# Patient Record
Sex: FEMALE | Race: OTHER | NOT HISPANIC OR LATINO
[De-identification: names, ages, dates, MRNs, and addresses within clinical notes are randomized per-mention and may not be internally consistent; named-entity substitution may affect disease eponyms.]

---

## 2017-01-26 ENCOUNTER — APPOINTMENT (OUTPATIENT)
Dept: INFUSION THERAPY | Facility: HOSPITAL | Age: 82
End: 2017-01-26

## 2017-01-26 ENCOUNTER — APPOINTMENT (OUTPATIENT)
Dept: HEMATOLOGY ONCOLOGY | Facility: CLINIC | Age: 82
End: 2017-01-26

## 2017-02-20 ENCOUNTER — LABORATORY RESULT (OUTPATIENT)
Age: 82
End: 2017-02-20

## 2017-02-22 ENCOUNTER — OUTPATIENT (OUTPATIENT)
Dept: OUTPATIENT SERVICES | Facility: HOSPITAL | Age: 82
LOS: 1 days | Discharge: HOME | End: 2017-02-22

## 2017-02-22 DIAGNOSIS — C50.911 MALIGNANT NEOPLASM OF UNSPECIFIED SITE OF RIGHT FEMALE BREAST: ICD-10-CM

## 2017-02-22 DIAGNOSIS — Z98.89 OTHER SPECIFIED POSTPROCEDURAL STATES: Chronic | ICD-10-CM

## 2017-02-22 DIAGNOSIS — C79.51 SECONDARY MALIGNANT NEOPLASM OF BONE: ICD-10-CM

## 2017-02-23 ENCOUNTER — APPOINTMENT (OUTPATIENT)
Dept: HEMATOLOGY ONCOLOGY | Facility: CLINIC | Age: 82
End: 2017-02-23

## 2017-02-23 ENCOUNTER — APPOINTMENT (OUTPATIENT)
Dept: INFUSION THERAPY | Facility: HOSPITAL | Age: 82
End: 2017-02-23

## 2017-02-23 VITALS
BODY MASS INDEX: 22.86 KG/M2 | RESPIRATION RATE: 100 BRPM | SYSTOLIC BLOOD PRESSURE: 120 MMHG | HEART RATE: 70 BPM | WEIGHT: 108 LBS | DIASTOLIC BLOOD PRESSURE: 80 MMHG | TEMPERATURE: 97.8 F

## 2017-03-23 ENCOUNTER — APPOINTMENT (OUTPATIENT)
Dept: INFUSION THERAPY | Facility: HOSPITAL | Age: 82
End: 2017-03-23

## 2017-03-23 ENCOUNTER — APPOINTMENT (OUTPATIENT)
Dept: HEMATOLOGY ONCOLOGY | Facility: CLINIC | Age: 82
End: 2017-03-23

## 2017-04-14 ENCOUNTER — LABORATORY RESULT (OUTPATIENT)
Age: 82
End: 2017-04-14

## 2017-04-18 ENCOUNTER — OUTPATIENT (OUTPATIENT)
Dept: OUTPATIENT SERVICES | Facility: HOSPITAL | Age: 82
LOS: 1 days | Discharge: HOME | End: 2017-04-18

## 2017-04-18 DIAGNOSIS — C50.911 MALIGNANT NEOPLASM OF UNSPECIFIED SITE OF RIGHT FEMALE BREAST: ICD-10-CM

## 2017-04-18 DIAGNOSIS — Z98.89 OTHER SPECIFIED POSTPROCEDURAL STATES: Chronic | ICD-10-CM

## 2017-04-20 ENCOUNTER — APPOINTMENT (OUTPATIENT)
Dept: INFUSION THERAPY | Facility: HOSPITAL | Age: 82
End: 2017-04-20

## 2017-04-20 ENCOUNTER — APPOINTMENT (OUTPATIENT)
Dept: HEMATOLOGY ONCOLOGY | Facility: CLINIC | Age: 82
End: 2017-04-20

## 2017-04-20 VITALS
RESPIRATION RATE: 16 BRPM | DIASTOLIC BLOOD PRESSURE: 64 MMHG | TEMPERATURE: 98.4 F | HEART RATE: 72 BPM | BODY MASS INDEX: 23.05 KG/M2 | WEIGHT: 108.91 LBS | SYSTOLIC BLOOD PRESSURE: 110 MMHG

## 2017-04-20 DIAGNOSIS — L57.0 ACTINIC KERATOSIS: ICD-10-CM

## 2017-04-21 DIAGNOSIS — C79.51 SECONDARY MALIGNANT NEOPLASM OF BONE: ICD-10-CM

## 2017-04-28 ENCOUNTER — CLINICAL ADVICE (OUTPATIENT)
Age: 82
End: 2017-04-28

## 2017-05-04 ENCOUNTER — OTHER (OUTPATIENT)
Age: 82
End: 2017-05-04

## 2017-05-05 ENCOUNTER — OTHER (OUTPATIENT)
Age: 82
End: 2017-05-05

## 2017-05-13 ENCOUNTER — LABORATORY RESULT (OUTPATIENT)
Age: 82
End: 2017-05-13

## 2017-05-15 ENCOUNTER — RESULT REVIEW (OUTPATIENT)
Age: 82
End: 2017-05-15

## 2017-05-16 ENCOUNTER — OUTPATIENT (OUTPATIENT)
Dept: OUTPATIENT SERVICES | Facility: HOSPITAL | Age: 82
LOS: 1 days | Discharge: HOME | End: 2017-05-16

## 2017-05-16 DIAGNOSIS — C50.911 MALIGNANT NEOPLASM OF UNSPECIFIED SITE OF RIGHT FEMALE BREAST: ICD-10-CM

## 2017-05-16 DIAGNOSIS — C79.51 SECONDARY MALIGNANT NEOPLASM OF BONE: ICD-10-CM

## 2017-05-16 DIAGNOSIS — Z98.89 OTHER SPECIFIED POSTPROCEDURAL STATES: Chronic | ICD-10-CM

## 2017-05-17 ENCOUNTER — APPOINTMENT (OUTPATIENT)
Dept: HEMATOLOGY ONCOLOGY | Facility: CLINIC | Age: 82
End: 2017-05-17
Payer: MEDICARE

## 2017-05-17 ENCOUNTER — APPOINTMENT (OUTPATIENT)
Dept: INFUSION THERAPY | Facility: HOSPITAL | Age: 82
End: 2017-05-17

## 2017-05-17 VITALS
HEART RATE: 64 BPM | SYSTOLIC BLOOD PRESSURE: 120 MMHG | DIASTOLIC BLOOD PRESSURE: 70 MMHG | OXYGEN SATURATION: 100 % | WEIGHT: 106.92 LBS | RESPIRATION RATE: 16 BRPM | BODY MASS INDEX: 22.63 KG/M2 | TEMPERATURE: 97.8 F

## 2017-05-17 DIAGNOSIS — R20.0 ANESTHESIA OF SKIN: ICD-10-CM

## 2017-05-17 PROCEDURE — 99215 OFFICE O/P EST HI 40 MIN: CPT

## 2017-05-31 ENCOUNTER — APPOINTMENT (OUTPATIENT)
Dept: INFUSION THERAPY | Facility: HOSPITAL | Age: 82
End: 2017-05-31

## 2017-06-10 ENCOUNTER — LABORATORY RESULT (OUTPATIENT)
Age: 82
End: 2017-06-10

## 2017-06-14 ENCOUNTER — APPOINTMENT (OUTPATIENT)
Dept: HEMATOLOGY ONCOLOGY | Facility: CLINIC | Age: 82
End: 2017-06-14
Payer: MEDICARE

## 2017-06-14 ENCOUNTER — APPOINTMENT (OUTPATIENT)
Dept: INFUSION THERAPY | Facility: HOSPITAL | Age: 82
End: 2017-06-14

## 2017-06-14 VITALS
TEMPERATURE: 97.7 F | WEIGHT: 105.82 LBS | RESPIRATION RATE: 16 BRPM | HEART RATE: 64 BPM | DIASTOLIC BLOOD PRESSURE: 70 MMHG | SYSTOLIC BLOOD PRESSURE: 120 MMHG | BODY MASS INDEX: 22.4 KG/M2 | OXYGEN SATURATION: 100 %

## 2017-06-14 PROCEDURE — 99215 OFFICE O/P EST HI 40 MIN: CPT

## 2017-06-14 RX ORDER — TRAMADOL HYDROCHLORIDE 50 MG/1
50 TABLET, COATED ORAL 4 TIMES DAILY
Qty: 120 | Refills: 0 | Status: DISCONTINUED | COMMUNITY
Start: 2017-05-17 | End: 2017-06-14

## 2017-07-15 ENCOUNTER — LABORATORY RESULT (OUTPATIENT)
Age: 82
End: 2017-07-15

## 2017-07-17 ENCOUNTER — OUTPATIENT (OUTPATIENT)
Dept: OUTPATIENT SERVICES | Facility: HOSPITAL | Age: 82
LOS: 1 days | Discharge: HOME | End: 2017-07-17

## 2017-07-17 DIAGNOSIS — Z98.89 OTHER SPECIFIED POSTPROCEDURAL STATES: Chronic | ICD-10-CM

## 2017-07-17 DIAGNOSIS — C50.911 MALIGNANT NEOPLASM OF UNSPECIFIED SITE OF RIGHT FEMALE BREAST: ICD-10-CM

## 2017-07-19 ENCOUNTER — APPOINTMENT (OUTPATIENT)
Dept: HEMATOLOGY ONCOLOGY | Facility: CLINIC | Age: 82
End: 2017-07-19

## 2017-07-19 ENCOUNTER — APPOINTMENT (OUTPATIENT)
Dept: INFUSION THERAPY | Facility: HOSPITAL | Age: 82
End: 2017-07-19

## 2017-07-19 VITALS
BODY MASS INDEX: 48.06 KG/M2 | OXYGEN SATURATION: 99 % | WEIGHT: 105.82 LBS | DIASTOLIC BLOOD PRESSURE: 70 MMHG | RESPIRATION RATE: 16 BRPM | TEMPERATURE: 97.4 F | SYSTOLIC BLOOD PRESSURE: 130 MMHG | HEART RATE: 66 BPM

## 2017-07-19 RX ORDER — ACETAMINOPHEN 500 MG/1
500 TABLET, COATED ORAL
Refills: 0 | Status: DISCONTINUED | COMMUNITY
Start: 2017-06-14 | End: 2017-07-19

## 2017-07-20 DIAGNOSIS — C79.51 SECONDARY MALIGNANT NEOPLASM OF BONE: ICD-10-CM

## 2017-08-10 ENCOUNTER — LABORATORY RESULT (OUTPATIENT)
Age: 82
End: 2017-08-10

## 2017-08-16 ENCOUNTER — APPOINTMENT (OUTPATIENT)
Dept: HEMATOLOGY ONCOLOGY | Facility: CLINIC | Age: 82
End: 2017-08-16
Payer: MEDICARE

## 2017-08-16 ENCOUNTER — APPOINTMENT (OUTPATIENT)
Dept: INFUSION THERAPY | Facility: HOSPITAL | Age: 82
End: 2017-08-16

## 2017-08-16 VITALS
SYSTOLIC BLOOD PRESSURE: 154 MMHG | OXYGEN SATURATION: 100 % | DIASTOLIC BLOOD PRESSURE: 77 MMHG | TEMPERATURE: 97.6 F | HEART RATE: 66 BPM | RESPIRATION RATE: 16 BRPM | BODY MASS INDEX: 22.35 KG/M2 | WEIGHT: 105.6 LBS

## 2017-08-16 DIAGNOSIS — E03.9 HYPOTHYROIDISM, UNSPECIFIED: ICD-10-CM

## 2017-08-16 PROCEDURE — 99214 OFFICE O/P EST MOD 30 MIN: CPT

## 2017-09-11 ENCOUNTER — LABORATORY RESULT (OUTPATIENT)
Age: 82
End: 2017-09-11

## 2017-09-11 ENCOUNTER — OUTPATIENT (OUTPATIENT)
Dept: OUTPATIENT SERVICES | Facility: HOSPITAL | Age: 82
LOS: 1 days | Discharge: HOME | End: 2017-09-11

## 2017-09-11 DIAGNOSIS — C50.911 MALIGNANT NEOPLASM OF UNSPECIFIED SITE OF RIGHT FEMALE BREAST: ICD-10-CM

## 2017-09-11 DIAGNOSIS — Z98.89 OTHER SPECIFIED POSTPROCEDURAL STATES: Chronic | ICD-10-CM

## 2017-09-12 ENCOUNTER — MEDICATION RENEWAL (OUTPATIENT)
Age: 82
End: 2017-09-12

## 2017-09-12 ENCOUNTER — RX RENEWAL (OUTPATIENT)
Age: 82
End: 2017-09-12

## 2017-09-13 ENCOUNTER — APPOINTMENT (OUTPATIENT)
Dept: HEMATOLOGY ONCOLOGY | Facility: CLINIC | Age: 82
End: 2017-09-13
Payer: MEDICARE

## 2017-09-13 ENCOUNTER — APPOINTMENT (OUTPATIENT)
Dept: INFUSION THERAPY | Facility: HOSPITAL | Age: 82
End: 2017-09-13

## 2017-09-13 VITALS
DIASTOLIC BLOOD PRESSURE: 73 MMHG | WEIGHT: 102.74 LBS | SYSTOLIC BLOOD PRESSURE: 160 MMHG | OXYGEN SATURATION: 99 % | RESPIRATION RATE: 16 BRPM | BODY MASS INDEX: 21.74 KG/M2 | TEMPERATURE: 97.7 F | HEART RATE: 69 BPM

## 2017-09-13 VITALS
SYSTOLIC BLOOD PRESSURE: 160 MMHG | WEIGHT: 102.74 LBS | DIASTOLIC BLOOD PRESSURE: 73 MMHG | TEMPERATURE: 97.7 F | OXYGEN SATURATION: 100 % | HEART RATE: 69 BPM | BODY MASS INDEX: 21.74 KG/M2 | RESPIRATION RATE: 16 BRPM

## 2017-09-13 DIAGNOSIS — R63.4 ABNORMAL WEIGHT LOSS: ICD-10-CM

## 2017-09-13 DIAGNOSIS — F32.9 MAJOR DEPRESSIVE DISORDER, SINGLE EPISODE, UNSPECIFIED: ICD-10-CM

## 2017-09-13 PROCEDURE — 99215 OFFICE O/P EST HI 40 MIN: CPT

## 2017-09-14 DIAGNOSIS — C79.51 SECONDARY MALIGNANT NEOPLASM OF BONE: ICD-10-CM

## 2017-10-13 ENCOUNTER — OUTPATIENT (OUTPATIENT)
Dept: OUTPATIENT SERVICES | Facility: HOSPITAL | Age: 82
LOS: 1 days | Discharge: HOME | End: 2017-10-13

## 2017-10-13 ENCOUNTER — LABORATORY RESULT (OUTPATIENT)
Age: 82
End: 2017-10-13

## 2017-10-13 DIAGNOSIS — Z98.89 OTHER SPECIFIED POSTPROCEDURAL STATES: Chronic | ICD-10-CM

## 2017-10-13 DIAGNOSIS — C50.911 MALIGNANT NEOPLASM OF UNSPECIFIED SITE OF RIGHT FEMALE BREAST: ICD-10-CM

## 2017-10-19 ENCOUNTER — APPOINTMENT (OUTPATIENT)
Dept: INFUSION THERAPY | Facility: HOSPITAL | Age: 82
End: 2017-10-19

## 2017-10-19 ENCOUNTER — APPOINTMENT (OUTPATIENT)
Dept: HEMATOLOGY ONCOLOGY | Facility: CLINIC | Age: 82
End: 2017-10-19
Payer: MEDICARE

## 2017-10-19 VITALS
TEMPERATURE: 97.8 F | RESPIRATION RATE: 16 BRPM | WEIGHT: 103.62 LBS | SYSTOLIC BLOOD PRESSURE: 120 MMHG | OXYGEN SATURATION: 97 % | DIASTOLIC BLOOD PRESSURE: 80 MMHG | HEART RATE: 62 BPM | BODY MASS INDEX: 21.93 KG/M2

## 2017-10-19 PROCEDURE — 99214 OFFICE O/P EST MOD 30 MIN: CPT

## 2017-10-20 DIAGNOSIS — C79.51 SECONDARY MALIGNANT NEOPLASM OF BONE: ICD-10-CM

## 2017-11-02 ENCOUNTER — OTHER (OUTPATIENT)
Age: 82
End: 2017-11-02

## 2017-11-02 ENCOUNTER — MEDICATION RENEWAL (OUTPATIENT)
Age: 82
End: 2017-11-02

## 2017-11-06 ENCOUNTER — LABORATORY RESULT (OUTPATIENT)
Age: 82
End: 2017-11-06

## 2017-11-07 ENCOUNTER — OTHER (OUTPATIENT)
Age: 82
End: 2017-11-07

## 2017-11-09 ENCOUNTER — OUTPATIENT (OUTPATIENT)
Dept: OUTPATIENT SERVICES | Facility: HOSPITAL | Age: 82
LOS: 1 days | Discharge: HOME | End: 2017-11-09

## 2017-11-09 DIAGNOSIS — C50.911 MALIGNANT NEOPLASM OF UNSPECIFIED SITE OF RIGHT FEMALE BREAST: ICD-10-CM

## 2017-11-09 DIAGNOSIS — Z98.89 OTHER SPECIFIED POSTPROCEDURAL STATES: Chronic | ICD-10-CM

## 2017-11-14 ENCOUNTER — APPOINTMENT (OUTPATIENT)
Dept: HEMATOLOGY ONCOLOGY | Facility: CLINIC | Age: 82
End: 2017-11-14
Payer: MEDICARE

## 2017-11-14 ENCOUNTER — APPOINTMENT (OUTPATIENT)
Dept: INFUSION THERAPY | Facility: HOSPITAL | Age: 82
End: 2017-11-14

## 2017-11-14 VITALS
BODY MASS INDEX: 21.14 KG/M2 | RESPIRATION RATE: 16 BRPM | WEIGHT: 102.07 LBS | SYSTOLIC BLOOD PRESSURE: 169 MMHG | HEIGHT: 58.31 IN | OXYGEN SATURATION: 98 % | DIASTOLIC BLOOD PRESSURE: 83 MMHG | HEART RATE: 71 BPM | TEMPERATURE: 98.3 F

## 2017-11-14 DIAGNOSIS — R97.8 OTHER ABNORMAL TUMOR MARKERS: ICD-10-CM

## 2017-11-14 PROCEDURE — 99215 OFFICE O/P EST HI 40 MIN: CPT

## 2017-11-14 PROCEDURE — 99213 OFFICE O/P EST LOW 20 MIN: CPT

## 2017-11-14 RX ORDER — GABAPENTIN 100 MG/1
100 CAPSULE ORAL
Qty: 60 | Refills: 6 | Status: DISCONTINUED | COMMUNITY
Start: 2017-05-17 | End: 2017-11-14

## 2017-11-14 RX ORDER — TRAMADOL HYDROCHLORIDE 50 MG/1
50 TABLET, COATED ORAL
Qty: 60 | Refills: 0 | Status: DISCONTINUED | COMMUNITY
Start: 2017-09-12 | End: 2017-11-14

## 2017-11-14 RX ORDER — MELOXICAM 7.5 MG/1
7.5 TABLET ORAL
Qty: 5 | Refills: 0 | Status: DISCONTINUED | COMMUNITY
Start: 2017-11-02 | End: 2017-11-14

## 2017-11-15 DIAGNOSIS — C79.51 SECONDARY MALIGNANT NEOPLASM OF BONE: ICD-10-CM

## 2017-11-17 ENCOUNTER — MESSAGE (OUTPATIENT)
Age: 82
End: 2017-11-17

## 2017-12-06 ENCOUNTER — LABORATORY RESULT (OUTPATIENT)
Age: 82
End: 2017-12-06

## 2017-12-07 ENCOUNTER — OUTPATIENT (OUTPATIENT)
Dept: OUTPATIENT SERVICES | Facility: HOSPITAL | Age: 82
LOS: 1 days | Discharge: HOME | End: 2017-12-07

## 2017-12-07 DIAGNOSIS — C79.51 SECONDARY MALIGNANT NEOPLASM OF BONE: ICD-10-CM

## 2017-12-07 DIAGNOSIS — C50.911 MALIGNANT NEOPLASM OF UNSPECIFIED SITE OF RIGHT FEMALE BREAST: ICD-10-CM

## 2017-12-07 DIAGNOSIS — Z98.89 OTHER SPECIFIED POSTPROCEDURAL STATES: Chronic | ICD-10-CM

## 2017-12-07 RX ORDER — HYDROMORPHONE HYDROCHLORIDE 2 MG/1
2 TABLET ORAL
Qty: 15 | Refills: 0 | Status: DISCONTINUED | COMMUNITY
Start: 2017-11-14 | End: 2017-11-17

## 2017-12-08 ENCOUNTER — RESULT REVIEW (OUTPATIENT)
Age: 82
End: 2017-12-08

## 2017-12-14 ENCOUNTER — APPOINTMENT (OUTPATIENT)
Dept: HEMATOLOGY ONCOLOGY | Facility: CLINIC | Age: 82
End: 2017-12-14
Payer: MEDICARE

## 2017-12-14 ENCOUNTER — APPOINTMENT (OUTPATIENT)
Dept: INFUSION THERAPY | Facility: HOSPITAL | Age: 82
End: 2017-12-14

## 2017-12-14 VITALS
DIASTOLIC BLOOD PRESSURE: 68 MMHG | BODY MASS INDEX: 21.65 KG/M2 | RESPIRATION RATE: 16 BRPM | SYSTOLIC BLOOD PRESSURE: 112 MMHG | WEIGHT: 104.72 LBS | TEMPERATURE: 98 F | HEART RATE: 63 BPM | OXYGEN SATURATION: 100 %

## 2017-12-14 DIAGNOSIS — M25.552 PAIN IN LEFT HIP: ICD-10-CM

## 2017-12-14 DIAGNOSIS — K22.70 BARRETT'S ESOPHAGUS W/OUT DYSPLASIA: ICD-10-CM

## 2017-12-14 DIAGNOSIS — R13.10 DYSPHAGIA, UNSPECIFIED: ICD-10-CM

## 2017-12-14 PROCEDURE — 99215 OFFICE O/P EST HI 40 MIN: CPT

## 2017-12-14 PROCEDURE — 99214 OFFICE O/P EST MOD 30 MIN: CPT

## 2017-12-14 RX ORDER — TRAMADOL HYDROCHLORIDE 50 MG/1
50 TABLET, COATED ORAL
Refills: 0 | Status: DISCONTINUED | COMMUNITY
Start: 2017-05-17 | End: 2017-12-14

## 2017-12-29 ENCOUNTER — OUTPATIENT (OUTPATIENT)
Dept: OUTPATIENT SERVICES | Facility: HOSPITAL | Age: 82
LOS: 1 days | Discharge: HOME | End: 2017-12-29

## 2017-12-29 DIAGNOSIS — C50.911 MALIGNANT NEOPLASM OF UNSPECIFIED SITE OF RIGHT FEMALE BREAST: ICD-10-CM

## 2017-12-29 DIAGNOSIS — Z98.89 OTHER SPECIFIED POSTPROCEDURAL STATES: Chronic | ICD-10-CM

## 2017-12-29 DIAGNOSIS — C79.51 SECONDARY MALIGNANT NEOPLASM OF BONE: ICD-10-CM

## 2018-01-02 ENCOUNTER — LABORATORY RESULT (OUTPATIENT)
Age: 83
End: 2018-01-02

## 2018-01-03 PROBLEM — M25.552 LEFT HIP PAIN: Status: RESOLVED | Noted: 2017-11-14 | Resolved: 2018-01-03

## 2018-01-03 PROBLEM — R13.10 DYSPHAGIA: Status: ACTIVE | Noted: 2017-11-14

## 2018-01-09 ENCOUNTER — APPOINTMENT (OUTPATIENT)
Dept: HEMATOLOGY ONCOLOGY | Facility: CLINIC | Age: 83
End: 2018-01-09
Payer: MEDICARE

## 2018-01-09 ENCOUNTER — APPOINTMENT (OUTPATIENT)
Dept: INFUSION THERAPY | Facility: HOSPITAL | Age: 83
End: 2018-01-09

## 2018-01-09 VITALS
HEART RATE: 59 BPM | DIASTOLIC BLOOD PRESSURE: 74 MMHG | WEIGHT: 105.82 LBS | RESPIRATION RATE: 16 BRPM | TEMPERATURE: 98.6 F | BODY MASS INDEX: 21.88 KG/M2 | SYSTOLIC BLOOD PRESSURE: 151 MMHG | OXYGEN SATURATION: 100 %

## 2018-01-09 DIAGNOSIS — L98.9 DISORDER OF THE SKIN AND SUBCUTANEOUS TISSUE, UNSPECIFIED: ICD-10-CM

## 2018-01-09 DIAGNOSIS — M19.90 UNSPECIFIED OSTEOARTHRITIS, UNSPECIFIED SITE: ICD-10-CM

## 2018-01-09 PROCEDURE — 99215 OFFICE O/P EST HI 40 MIN: CPT

## 2018-01-09 PROCEDURE — 99214 OFFICE O/P EST MOD 30 MIN: CPT

## 2018-01-09 RX ORDER — GINSENG 500 MG
TABLET ORAL
Refills: 0 | Status: DISCONTINUED | COMMUNITY
End: 2018-01-09

## 2018-01-09 RX ORDER — PNV NO.95/FERROUS FUM/FOLIC AC 28MG-0.8MG
TABLET ORAL
Refills: 0 | Status: DISCONTINUED | COMMUNITY
End: 2018-01-09

## 2018-01-09 RX ORDER — GABAPENTIN 250 MG/5ML
250 SOLUTION ORAL
Qty: 100 | Refills: 0 | Status: DISCONTINUED | COMMUNITY
Start: 2017-11-14 | End: 2018-01-09

## 2018-01-09 RX ORDER — ASCORBIC ACID 500 MG
500 TABLET ORAL
Refills: 0 | Status: ACTIVE | COMMUNITY
Start: 2018-01-09

## 2018-01-09 RX ORDER — ACETAMINOPHEN AND CODEINE PHOSPHATE 120; 12 MG/5ML; MG/5ML
120-12 SOLUTION ORAL
Qty: 150 | Refills: 0 | Status: DISCONTINUED | COMMUNITY
Start: 2017-11-14 | End: 2018-01-09

## 2018-01-09 RX ORDER — B-COMPLEX WITH VITAMIN C
TABLET ORAL
Refills: 0 | Status: DISCONTINUED | COMMUNITY
End: 2018-01-09

## 2018-01-31 ENCOUNTER — LABORATORY RESULT (OUTPATIENT)
Age: 83
End: 2018-01-31

## 2018-02-01 ENCOUNTER — OUTPATIENT (OUTPATIENT)
Dept: OUTPATIENT SERVICES | Facility: HOSPITAL | Age: 83
LOS: 1 days | Discharge: HOME | End: 2018-02-01

## 2018-02-01 DIAGNOSIS — Z98.89 OTHER SPECIFIED POSTPROCEDURAL STATES: Chronic | ICD-10-CM

## 2018-02-01 DIAGNOSIS — C50.911 MALIGNANT NEOPLASM OF UNSPECIFIED SITE OF RIGHT FEMALE BREAST: ICD-10-CM

## 2018-02-01 DIAGNOSIS — C79.51 SECONDARY MALIGNANT NEOPLASM OF BONE: ICD-10-CM

## 2018-02-06 ENCOUNTER — APPOINTMENT (OUTPATIENT)
Dept: HEMATOLOGY ONCOLOGY | Facility: CLINIC | Age: 83
End: 2018-02-06
Payer: MEDICARE

## 2018-02-06 ENCOUNTER — APPOINTMENT (OUTPATIENT)
Dept: INFUSION THERAPY | Facility: HOSPITAL | Age: 83
End: 2018-02-06

## 2018-02-06 VITALS
BODY MASS INDEX: 22.11 KG/M2 | RESPIRATION RATE: 16 BRPM | HEART RATE: 66 BPM | WEIGHT: 106.9 LBS | DIASTOLIC BLOOD PRESSURE: 68 MMHG | SYSTOLIC BLOOD PRESSURE: 158 MMHG | TEMPERATURE: 97.9 F | OXYGEN SATURATION: 100 %

## 2018-02-06 PROCEDURE — 99214 OFFICE O/P EST MOD 30 MIN: CPT

## 2018-03-01 ENCOUNTER — OUTPATIENT (OUTPATIENT)
Dept: OUTPATIENT SERVICES | Facility: HOSPITAL | Age: 83
LOS: 1 days | Discharge: HOME | End: 2018-03-01

## 2018-03-01 ENCOUNTER — LABORATORY RESULT (OUTPATIENT)
Age: 83
End: 2018-03-01

## 2018-03-01 DIAGNOSIS — C79.51 SECONDARY MALIGNANT NEOPLASM OF BONE: ICD-10-CM

## 2018-03-01 DIAGNOSIS — Z98.89 OTHER SPECIFIED POSTPROCEDURAL STATES: Chronic | ICD-10-CM

## 2018-03-01 DIAGNOSIS — C50.911 MALIGNANT NEOPLASM OF UNSPECIFIED SITE OF RIGHT FEMALE BREAST: ICD-10-CM

## 2018-03-06 ENCOUNTER — APPOINTMENT (OUTPATIENT)
Dept: INFUSION THERAPY | Facility: HOSPITAL | Age: 83
End: 2018-03-06

## 2018-03-06 ENCOUNTER — APPOINTMENT (OUTPATIENT)
Dept: HEMATOLOGY ONCOLOGY | Facility: CLINIC | Age: 83
End: 2018-03-06
Payer: MEDICARE

## 2018-03-06 VITALS
BODY MASS INDEX: 21.88 KG/M2 | SYSTOLIC BLOOD PRESSURE: 173 MMHG | WEIGHT: 105.82 LBS | HEART RATE: 73 BPM | TEMPERATURE: 98 F | OXYGEN SATURATION: 100 % | DIASTOLIC BLOOD PRESSURE: 80 MMHG | RESPIRATION RATE: 16 BRPM

## 2018-03-06 VITALS — SYSTOLIC BLOOD PRESSURE: 157 MMHG | DIASTOLIC BLOOD PRESSURE: 71 MMHG

## 2018-03-06 PROCEDURE — 99214 OFFICE O/P EST MOD 30 MIN: CPT

## 2018-03-29 ENCOUNTER — LABORATORY RESULT (OUTPATIENT)
Age: 83
End: 2018-03-29

## 2018-04-03 ENCOUNTER — OUTPATIENT (OUTPATIENT)
Dept: OUTPATIENT SERVICES | Facility: HOSPITAL | Age: 83
LOS: 1 days | Discharge: HOME | End: 2018-04-03

## 2018-04-03 ENCOUNTER — RESULT REVIEW (OUTPATIENT)
Age: 83
End: 2018-04-03

## 2018-04-03 DIAGNOSIS — Z98.89 OTHER SPECIFIED POSTPROCEDURAL STATES: Chronic | ICD-10-CM

## 2018-04-03 DIAGNOSIS — C50.911 MALIGNANT NEOPLASM OF UNSPECIFIED SITE OF RIGHT FEMALE BREAST: ICD-10-CM

## 2018-04-03 DIAGNOSIS — C79.51 SECONDARY MALIGNANT NEOPLASM OF BONE: ICD-10-CM

## 2018-04-06 ENCOUNTER — APPOINTMENT (OUTPATIENT)
Dept: HEMATOLOGY ONCOLOGY | Facility: CLINIC | Age: 83
End: 2018-04-06
Payer: MEDICARE

## 2018-04-06 ENCOUNTER — APPOINTMENT (OUTPATIENT)
Dept: INFUSION THERAPY | Facility: HOSPITAL | Age: 83
End: 2018-04-06

## 2018-04-06 VITALS
BODY MASS INDEX: 22.2 KG/M2 | RESPIRATION RATE: 16 BRPM | TEMPERATURE: 97.6 F | OXYGEN SATURATION: 100 % | DIASTOLIC BLOOD PRESSURE: 76 MMHG | WEIGHT: 107.36 LBS | SYSTOLIC BLOOD PRESSURE: 168 MMHG | HEART RATE: 64 BPM

## 2018-04-06 VITALS
BODY MASS INDEX: 22.13 KG/M2 | HEART RATE: 64 BPM | DIASTOLIC BLOOD PRESSURE: 76 MMHG | SYSTOLIC BLOOD PRESSURE: 168 MMHG | TEMPERATURE: 97.6 F | RESPIRATION RATE: 16 BRPM | OXYGEN SATURATION: 100 % | WEIGHT: 106.99 LBS

## 2018-04-06 PROCEDURE — 99215 OFFICE O/P EST HI 40 MIN: CPT

## 2018-04-06 RX ORDER — CLONAZEPAM 1 MG/1
1 TABLET ORAL
Qty: 15 | Refills: 0 | Status: DISCONTINUED | COMMUNITY
Start: 2017-11-30 | End: 2018-04-06

## 2018-04-06 RX ORDER — HYDROCODONE BITARTRATE AND ACETAMINOPHEN 5; 325 MG/1; MG/1
5-325 TABLET ORAL
Qty: 30 | Refills: 0 | Status: DISCONTINUED | COMMUNITY
Start: 2018-01-09 | End: 2018-04-06

## 2018-04-10 ENCOUNTER — RX RENEWAL (OUTPATIENT)
Age: 83
End: 2018-04-10

## 2018-05-02 ENCOUNTER — LABORATORY RESULT (OUTPATIENT)
Age: 83
End: 2018-05-02

## 2018-05-03 ENCOUNTER — APPOINTMENT (OUTPATIENT)
Dept: INFUSION THERAPY | Facility: HOSPITAL | Age: 83
End: 2018-05-03

## 2018-05-03 ENCOUNTER — APPOINTMENT (OUTPATIENT)
Dept: HEMATOLOGY ONCOLOGY | Facility: CLINIC | Age: 83
End: 2018-05-03
Payer: MEDICARE

## 2018-05-03 VITALS
DIASTOLIC BLOOD PRESSURE: 66 MMHG | WEIGHT: 105.82 LBS | TEMPERATURE: 98.2 F | HEART RATE: 67 BPM | SYSTOLIC BLOOD PRESSURE: 159 MMHG | OXYGEN SATURATION: 100 % | BODY MASS INDEX: 21.88 KG/M2 | RESPIRATION RATE: 16 BRPM

## 2018-05-03 PROCEDURE — 99214 OFFICE O/P EST MOD 30 MIN: CPT

## 2018-05-14 ENCOUNTER — LABORATORY RESULT (OUTPATIENT)
Age: 83
End: 2018-05-14

## 2018-05-22 ENCOUNTER — LABORATORY RESULT (OUTPATIENT)
Age: 83
End: 2018-05-22

## 2018-05-22 ENCOUNTER — RX RENEWAL (OUTPATIENT)
Age: 83
End: 2018-05-22

## 2018-05-23 ENCOUNTER — RESULT REVIEW (OUTPATIENT)
Age: 83
End: 2018-05-23

## 2018-05-25 ENCOUNTER — OUTPATIENT (OUTPATIENT)
Dept: OUTPATIENT SERVICES | Facility: HOSPITAL | Age: 83
LOS: 1 days | Discharge: HOME | End: 2018-05-25

## 2018-05-25 DIAGNOSIS — Z98.89 OTHER SPECIFIED POSTPROCEDURAL STATES: Chronic | ICD-10-CM

## 2018-05-25 DIAGNOSIS — C50.911 MALIGNANT NEOPLASM OF UNSPECIFIED SITE OF RIGHT FEMALE BREAST: ICD-10-CM

## 2018-05-25 DIAGNOSIS — C79.51 SECONDARY MALIGNANT NEOPLASM OF BONE: ICD-10-CM

## 2018-05-31 ENCOUNTER — RESULT REVIEW (OUTPATIENT)
Age: 83
End: 2018-05-31

## 2018-05-31 ENCOUNTER — APPOINTMENT (OUTPATIENT)
Dept: INFUSION THERAPY | Facility: HOSPITAL | Age: 83
End: 2018-05-31

## 2018-05-31 ENCOUNTER — APPOINTMENT (OUTPATIENT)
Dept: HEMATOLOGY ONCOLOGY | Facility: CLINIC | Age: 83
End: 2018-05-31
Payer: MEDICARE

## 2018-05-31 VITALS
HEART RATE: 70 BPM | WEIGHT: 105.82 LBS | SYSTOLIC BLOOD PRESSURE: 120 MMHG | OXYGEN SATURATION: 98 % | DIASTOLIC BLOOD PRESSURE: 70 MMHG | BODY MASS INDEX: 21.88 KG/M2 | TEMPERATURE: 97.8 F | RESPIRATION RATE: 16 BRPM

## 2018-05-31 LAB
HCT VFR BLD CALC: 29.1 % — LOW (ref 34.5–45)
HGB BLD-MCNC: 9.8 G/DL — LOW (ref 11.5–15.5)
MCHC RBC-ENTMCNC: 30.4 PG — SIGNIFICANT CHANGE UP (ref 27–34)
MCHC RBC-ENTMCNC: 33.8 G/DL — SIGNIFICANT CHANGE UP (ref 32–36)
MCV RBC AUTO: 89.9 FL — SIGNIFICANT CHANGE UP (ref 80–100)
PLATELET # BLD AUTO: 270 K/UL — SIGNIFICANT CHANGE UP (ref 150–400)
RBC # BLD: 3.23 M/UL — LOW (ref 3.8–5.2)
RBC # FLD: 15.9 % — HIGH (ref 10.3–14.5)
WBC # BLD: 4.4 K/UL — SIGNIFICANT CHANGE UP (ref 3.8–10.5)
WBC # FLD AUTO: 4.4 K/UL — SIGNIFICANT CHANGE UP (ref 3.8–10.5)

## 2018-05-31 PROCEDURE — 99214 OFFICE O/P EST MOD 30 MIN: CPT

## 2018-06-04 ENCOUNTER — RX RENEWAL (OUTPATIENT)
Age: 83
End: 2018-06-04

## 2018-06-05 ENCOUNTER — MEDICATION RENEWAL (OUTPATIENT)
Age: 83
End: 2018-06-05

## 2018-06-19 ENCOUNTER — LABORATORY RESULT (OUTPATIENT)
Age: 83
End: 2018-06-19

## 2018-06-20 ENCOUNTER — MESSAGE (OUTPATIENT)
Age: 83
End: 2018-06-20

## 2018-06-25 ENCOUNTER — LABORATORY RESULT (OUTPATIENT)
Age: 83
End: 2018-06-25

## 2018-06-25 ENCOUNTER — OTHER (OUTPATIENT)
Age: 83
End: 2018-06-25

## 2018-06-26 ENCOUNTER — OUTPATIENT (OUTPATIENT)
Dept: OUTPATIENT SERVICES | Facility: HOSPITAL | Age: 83
LOS: 1 days | Discharge: HOME | End: 2018-06-26

## 2018-06-26 DIAGNOSIS — C79.51 SECONDARY MALIGNANT NEOPLASM OF BONE: ICD-10-CM

## 2018-06-26 DIAGNOSIS — C50.911 MALIGNANT NEOPLASM OF UNSPECIFIED SITE OF RIGHT FEMALE BREAST: ICD-10-CM

## 2018-06-26 DIAGNOSIS — Z98.89 OTHER SPECIFIED POSTPROCEDURAL STATES: Chronic | ICD-10-CM

## 2018-06-28 ENCOUNTER — RX RENEWAL (OUTPATIENT)
Age: 83
End: 2018-06-28

## 2018-06-29 ENCOUNTER — APPOINTMENT (OUTPATIENT)
Dept: HEMATOLOGY ONCOLOGY | Facility: CLINIC | Age: 83
End: 2018-06-29
Payer: MEDICARE

## 2018-06-29 ENCOUNTER — APPOINTMENT (OUTPATIENT)
Dept: INFUSION THERAPY | Facility: HOSPITAL | Age: 83
End: 2018-06-29

## 2018-06-29 VITALS
HEART RATE: 72 BPM | DIASTOLIC BLOOD PRESSURE: 65 MMHG | BODY MASS INDEX: 21.43 KG/M2 | OXYGEN SATURATION: 100 % | RESPIRATION RATE: 16 BRPM | SYSTOLIC BLOOD PRESSURE: 143 MMHG | WEIGHT: 103.62 LBS | TEMPERATURE: 98 F

## 2018-06-29 DIAGNOSIS — M89.9 DISORDER OF BONE, UNSPECIFIED: ICD-10-CM

## 2018-06-29 DIAGNOSIS — M25.511 PAIN IN RIGHT SHOULDER: ICD-10-CM

## 2018-06-29 DIAGNOSIS — R49.0 DYSPHONIA: ICD-10-CM

## 2018-06-29 DIAGNOSIS — L65.9 NONSCARRING HAIR LOSS, UNSPECIFIED: ICD-10-CM

## 2018-06-29 PROCEDURE — 99214 OFFICE O/P EST MOD 30 MIN: CPT

## 2018-06-29 RX ORDER — LEVOTHYROXINE SODIUM 0.07 MG/1
75 TABLET ORAL
Qty: 90 | Refills: 0 | Status: DISCONTINUED | COMMUNITY
Start: 2018-06-26

## 2018-07-16 ENCOUNTER — LABORATORY RESULT (OUTPATIENT)
Age: 83
End: 2018-07-16

## 2018-07-25 ENCOUNTER — RESULT REVIEW (OUTPATIENT)
Age: 83
End: 2018-07-25

## 2018-07-25 ENCOUNTER — APPOINTMENT (OUTPATIENT)
Dept: INFUSION THERAPY | Facility: HOSPITAL | Age: 83
End: 2018-07-25

## 2018-07-25 ENCOUNTER — APPOINTMENT (OUTPATIENT)
Dept: HEMATOLOGY ONCOLOGY | Facility: CLINIC | Age: 83
End: 2018-07-25
Payer: MEDICARE

## 2018-07-25 VITALS
RESPIRATION RATE: 17 BRPM | SYSTOLIC BLOOD PRESSURE: 120 MMHG | WEIGHT: 102.49 LBS | TEMPERATURE: 98.4 F | HEART RATE: 75 BPM | OXYGEN SATURATION: 100 % | DIASTOLIC BLOOD PRESSURE: 70 MMHG | BODY MASS INDEX: 21.2 KG/M2

## 2018-07-25 LAB
ANISOCYTOSIS BLD QL: SLIGHT — SIGNIFICANT CHANGE UP
BASO STIPL BLD QL SMEAR: PRESENT — SIGNIFICANT CHANGE UP
BASOPHILS # BLD AUTO: 0 K/UL — SIGNIFICANT CHANGE UP (ref 0–0.2)
BASOPHILS NFR BLD AUTO: 1 % — SIGNIFICANT CHANGE UP (ref 0–2)
EOSINOPHIL # BLD AUTO: 0.1 K/UL — SIGNIFICANT CHANGE UP (ref 0–0.5)
GIANT PLATELETS BLD QL SMEAR: PRESENT — SIGNIFICANT CHANGE UP
HCT VFR BLD CALC: 30.6 % — LOW (ref 34.5–45)
HGB BLD-MCNC: 10.4 G/DL — LOW (ref 11.5–15.5)
LG PLATELETS BLD QL AUTO: SLIGHT — SIGNIFICANT CHANGE UP
LYMPHOCYTES # BLD AUTO: 1.2 K/UL — SIGNIFICANT CHANGE UP (ref 1–3.3)
LYMPHOCYTES # BLD AUTO: 41 % — SIGNIFICANT CHANGE UP (ref 13–44)
MACROCYTES BLD QL: SLIGHT — SIGNIFICANT CHANGE UP
MCHC RBC-ENTMCNC: 32.6 PG — SIGNIFICANT CHANGE UP (ref 27–34)
MCHC RBC-ENTMCNC: 33.8 G/DL — SIGNIFICANT CHANGE UP (ref 32–36)
MCV RBC AUTO: 96.4 FL — SIGNIFICANT CHANGE UP (ref 80–100)
MONOCYTES # BLD AUTO: 0.7 K/UL — SIGNIFICANT CHANGE UP (ref 0–0.9)
MONOCYTES NFR BLD AUTO: 15 % — HIGH (ref 2–14)
NEUTROPHILS # BLD AUTO: 1.2 K/UL — LOW (ref 1.8–7.4)
NEUTROPHILS NFR BLD AUTO: 43 % — SIGNIFICANT CHANGE UP (ref 43–77)
PLAT MORPH BLD: ABNORMAL
PLATELET # BLD AUTO: 157 K/UL — SIGNIFICANT CHANGE UP (ref 150–400)
POIKILOCYTOSIS BLD QL AUTO: SLIGHT — SIGNIFICANT CHANGE UP
POLYCHROMASIA BLD QL SMEAR: SLIGHT — SIGNIFICANT CHANGE UP
RBC # BLD: 3.18 M/UL — LOW (ref 3.8–5.2)
RBC # FLD: 17.3 % — HIGH (ref 10.3–14.5)
RBC BLD AUTO: SIGNIFICANT CHANGE UP
WBC # BLD: 3.2 K/UL — LOW (ref 3.8–10.5)
WBC # FLD AUTO: 3.2 K/UL — LOW (ref 3.8–10.5)

## 2018-07-25 PROCEDURE — 99214 OFFICE O/P EST MOD 30 MIN: CPT

## 2018-08-09 ENCOUNTER — LABORATORY RESULT (OUTPATIENT)
Age: 83
End: 2018-08-09

## 2018-08-10 ENCOUNTER — RESULT REVIEW (OUTPATIENT)
Age: 83
End: 2018-08-10

## 2018-08-10 ENCOUNTER — MESSAGE (OUTPATIENT)
Age: 83
End: 2018-08-10

## 2018-08-10 ENCOUNTER — OUTPATIENT (OUTPATIENT)
Dept: OUTPATIENT SERVICES | Facility: HOSPITAL | Age: 83
LOS: 1 days | Discharge: HOME | End: 2018-08-10

## 2018-08-10 DIAGNOSIS — C50.911 MALIGNANT NEOPLASM OF UNSPECIFIED SITE OF RIGHT FEMALE BREAST: ICD-10-CM

## 2018-08-10 DIAGNOSIS — Z98.89 OTHER SPECIFIED POSTPROCEDURAL STATES: Chronic | ICD-10-CM

## 2018-08-22 ENCOUNTER — LABORATORY RESULT (OUTPATIENT)
Age: 83
End: 2018-08-22

## 2018-08-22 ENCOUNTER — APPOINTMENT (OUTPATIENT)
Dept: HEMATOLOGY ONCOLOGY | Facility: CLINIC | Age: 83
End: 2018-08-22
Payer: MEDICARE

## 2018-08-22 ENCOUNTER — RESULT REVIEW (OUTPATIENT)
Age: 83
End: 2018-08-22

## 2018-08-22 ENCOUNTER — APPOINTMENT (OUTPATIENT)
Dept: INFUSION THERAPY | Facility: HOSPITAL | Age: 83
End: 2018-08-22

## 2018-08-22 VITALS
RESPIRATION RATE: 16 BRPM | HEART RATE: 78 BPM | BODY MASS INDEX: 22.11 KG/M2 | SYSTOLIC BLOOD PRESSURE: 143 MMHG | DIASTOLIC BLOOD PRESSURE: 78 MMHG | OXYGEN SATURATION: 99 % | WEIGHT: 106.92 LBS | TEMPERATURE: 97.5 F

## 2018-08-22 VITALS
RESPIRATION RATE: 17 BRPM | TEMPERATURE: 97.6 F | SYSTOLIC BLOOD PRESSURE: 124 MMHG | DIASTOLIC BLOOD PRESSURE: 82 MMHG | BODY MASS INDEX: 28.99 KG/M2 | OXYGEN SATURATION: 99 % | HEART RATE: 75 BPM | WEIGHT: 140.21 LBS

## 2018-08-22 LAB
BASOPHILS # BLD AUTO: 0.1 K/UL — SIGNIFICANT CHANGE UP (ref 0–0.2)
BASOPHILS NFR BLD AUTO: 2 % — SIGNIFICANT CHANGE UP (ref 0–2)
EOSINOPHIL # BLD AUTO: 0 K/UL — SIGNIFICANT CHANGE UP (ref 0–0.5)
EOSINOPHIL NFR BLD AUTO: 0.5 % — SIGNIFICANT CHANGE UP (ref 0–6)
HCT VFR BLD CALC: 28.2 % — LOW (ref 34.5–45)
HGB BLD-MCNC: 9.4 G/DL — LOW (ref 11.5–15.5)
LYMPHOCYTES # BLD AUTO: 1.1 K/UL — SIGNIFICANT CHANGE UP (ref 1–3.3)
LYMPHOCYTES # BLD AUTO: 38.3 % — SIGNIFICANT CHANGE UP (ref 13–44)
MCHC RBC-ENTMCNC: 33.3 G/DL — SIGNIFICANT CHANGE UP (ref 32–36)
MCHC RBC-ENTMCNC: 33.7 PG — SIGNIFICANT CHANGE UP (ref 27–34)
MCV RBC AUTO: 101 FL — HIGH (ref 80–100)
MONOCYTES # BLD AUTO: 0.6 K/UL — SIGNIFICANT CHANGE UP (ref 0–0.9)
MONOCYTES NFR BLD AUTO: 19.3 % — HIGH (ref 2–14)
NEUTROPHILS # BLD AUTO: 1.2 K/UL — LOW (ref 1.8–7.4)
NEUTROPHILS NFR BLD AUTO: 39.8 % — LOW (ref 43–77)
PLAT MORPH BLD: NORMAL — SIGNIFICANT CHANGE UP
PLATELET # BLD AUTO: 204 K/UL — SIGNIFICANT CHANGE UP (ref 150–400)
RBC # BLD: 2.8 M/UL — LOW (ref 3.8–5.2)
RBC # FLD: 15.5 % — HIGH (ref 10.3–14.5)
RBC BLD AUTO: SIGNIFICANT CHANGE UP
WBC # BLD: 2.9 K/UL — LOW (ref 3.8–10.5)
WBC # FLD AUTO: 2.9 K/UL — LOW (ref 3.8–10.5)

## 2018-08-22 PROCEDURE — 99214 OFFICE O/P EST MOD 30 MIN: CPT

## 2018-08-23 DIAGNOSIS — C79.51 SECONDARY MALIGNANT NEOPLASM OF BONE: ICD-10-CM

## 2018-09-07 ENCOUNTER — LABORATORY RESULT (OUTPATIENT)
Age: 83
End: 2018-09-07

## 2018-09-07 ENCOUNTER — RESULT REVIEW (OUTPATIENT)
Age: 83
End: 2018-09-07

## 2018-09-13 ENCOUNTER — OUTPATIENT (OUTPATIENT)
Dept: OUTPATIENT SERVICES | Facility: HOSPITAL | Age: 83
LOS: 1 days | Discharge: HOME | End: 2018-09-13

## 2018-09-13 DIAGNOSIS — C50.911 MALIGNANT NEOPLASM OF UNSPECIFIED SITE OF RIGHT FEMALE BREAST: ICD-10-CM

## 2018-09-13 DIAGNOSIS — Z98.89 OTHER SPECIFIED POSTPROCEDURAL STATES: Chronic | ICD-10-CM

## 2018-09-18 ENCOUNTER — INBOUND DOCUMENT (OUTPATIENT)
Age: 83
End: 2018-09-18

## 2018-09-20 ENCOUNTER — RESULT REVIEW (OUTPATIENT)
Age: 83
End: 2018-09-20

## 2018-09-20 ENCOUNTER — APPOINTMENT (OUTPATIENT)
Dept: HEMATOLOGY ONCOLOGY | Facility: CLINIC | Age: 83
End: 2018-09-20
Payer: MEDICARE

## 2018-09-20 ENCOUNTER — APPOINTMENT (OUTPATIENT)
Dept: INFUSION THERAPY | Facility: HOSPITAL | Age: 83
End: 2018-09-20

## 2018-09-20 ENCOUNTER — RX RENEWAL (OUTPATIENT)
Age: 83
End: 2018-09-20

## 2018-09-20 VITALS
RESPIRATION RATE: 17 BRPM | OXYGEN SATURATION: 100 % | SYSTOLIC BLOOD PRESSURE: 126 MMHG | TEMPERATURE: 98.2 F | BODY MASS INDEX: 22.79 KG/M2 | WEIGHT: 110.23 LBS | HEART RATE: 60 BPM | DIASTOLIC BLOOD PRESSURE: 75 MMHG

## 2018-09-20 LAB
BASO STIPL BLD QL SMEAR: PRESENT — SIGNIFICANT CHANGE UP
BASOPHILS # BLD AUTO: 0.1 K/UL — SIGNIFICANT CHANGE UP (ref 0–0.2)
BASOPHILS NFR BLD AUTO: 1 % — SIGNIFICANT CHANGE UP (ref 0–2)
EOSINOPHIL # BLD AUTO: 0 K/UL — SIGNIFICANT CHANGE UP (ref 0–0.5)
HCT VFR BLD CALC: 29.6 % — LOW (ref 34.5–45)
HGB BLD-MCNC: 9.8 G/DL — LOW (ref 11.5–15.5)
LYMPHOCYTES # BLD AUTO: 1.4 K/UL — SIGNIFICANT CHANGE UP (ref 1–3.3)
LYMPHOCYTES # BLD AUTO: 51 % — HIGH (ref 13–44)
MCHC RBC-ENTMCNC: 33.1 G/DL — SIGNIFICANT CHANGE UP (ref 32–36)
MCHC RBC-ENTMCNC: 33.2 PG — SIGNIFICANT CHANGE UP (ref 27–34)
MCV RBC AUTO: 100 FL — SIGNIFICANT CHANGE UP (ref 80–100)
MONOCYTES # BLD AUTO: 0.8 K/UL — SIGNIFICANT CHANGE UP (ref 0–0.9)
MONOCYTES NFR BLD AUTO: 11 % — SIGNIFICANT CHANGE UP (ref 2–14)
NEUTROPHILS # BLD AUTO: 1.7 K/UL — LOW (ref 1.8–7.4)
NEUTROPHILS NFR BLD AUTO: 36 % — LOW (ref 43–77)
NEUTS BAND # BLD: 1 % — SIGNIFICANT CHANGE UP (ref 0–8)
PLAT MORPH BLD: NORMAL — SIGNIFICANT CHANGE UP
PLATELET # BLD AUTO: 174 K/UL — SIGNIFICANT CHANGE UP (ref 150–400)
RBC # BLD: 2.95 M/UL — LOW (ref 3.8–5.2)
RBC # FLD: 14.4 % — SIGNIFICANT CHANGE UP (ref 10.3–14.5)
RBC BLD AUTO: SIGNIFICANT CHANGE UP
WBC # BLD: 4 K/UL — SIGNIFICANT CHANGE UP (ref 3.8–10.5)
WBC # FLD AUTO: 4 K/UL — SIGNIFICANT CHANGE UP (ref 3.8–10.5)

## 2018-09-20 PROCEDURE — 99214 OFFICE O/P EST MOD 30 MIN: CPT

## 2018-09-21 DIAGNOSIS — C79.51 SECONDARY MALIGNANT NEOPLASM OF BONE: ICD-10-CM

## 2018-10-03 ENCOUNTER — LABORATORY RESULT (OUTPATIENT)
Age: 83
End: 2018-10-03

## 2018-10-10 ENCOUNTER — OUTPATIENT (OUTPATIENT)
Dept: OUTPATIENT SERVICES | Facility: HOSPITAL | Age: 83
LOS: 1 days | Discharge: HOME | End: 2018-10-10

## 2018-10-10 DIAGNOSIS — Z98.89 OTHER SPECIFIED POSTPROCEDURAL STATES: Chronic | ICD-10-CM

## 2018-10-10 DIAGNOSIS — C50.911 MALIGNANT NEOPLASM OF UNSPECIFIED SITE OF RIGHT FEMALE BREAST: ICD-10-CM

## 2018-10-10 DIAGNOSIS — C79.51 SECONDARY MALIGNANT NEOPLASM OF BONE: ICD-10-CM

## 2018-10-15 ENCOUNTER — LABORATORY RESULT (OUTPATIENT)
Age: 83
End: 2018-10-15

## 2018-10-17 ENCOUNTER — RESULT REVIEW (OUTPATIENT)
Age: 83
End: 2018-10-17

## 2018-10-18 ENCOUNTER — RESULT REVIEW (OUTPATIENT)
Age: 83
End: 2018-10-18

## 2018-10-18 ENCOUNTER — APPOINTMENT (OUTPATIENT)
Dept: INFUSION THERAPY | Facility: HOSPITAL | Age: 83
End: 2018-10-18

## 2018-10-18 ENCOUNTER — APPOINTMENT (OUTPATIENT)
Dept: HEMATOLOGY ONCOLOGY | Facility: CLINIC | Age: 83
End: 2018-10-18
Payer: MEDICARE

## 2018-10-18 VITALS
WEIGHT: 106.92 LBS | RESPIRATION RATE: 16 BRPM | DIASTOLIC BLOOD PRESSURE: 73 MMHG | OXYGEN SATURATION: 100 % | HEART RATE: 65 BPM | TEMPERATURE: 98 F | BODY MASS INDEX: 22.11 KG/M2 | SYSTOLIC BLOOD PRESSURE: 170 MMHG

## 2018-10-18 LAB
BASOPHILS # BLD AUTO: 0 K/UL — SIGNIFICANT CHANGE UP (ref 0–0.2)
BASOPHILS NFR BLD AUTO: 1 % — SIGNIFICANT CHANGE UP (ref 0–2)
EOSINOPHIL # BLD AUTO: 0 K/UL — SIGNIFICANT CHANGE UP (ref 0–0.5)
HCT VFR BLD CALC: 29.5 % — LOW (ref 34.5–45)
HGB BLD-MCNC: 10.1 G/DL — LOW (ref 11.5–15.5)
LYMPHOCYTES # BLD AUTO: 1.1 K/UL — SIGNIFICANT CHANGE UP (ref 1–3.3)
LYMPHOCYTES # BLD AUTO: 45 % — HIGH (ref 13–44)
MCHC RBC-ENTMCNC: 33.9 PG — SIGNIFICANT CHANGE UP (ref 27–34)
MCHC RBC-ENTMCNC: 34.3 G/DL — SIGNIFICANT CHANGE UP (ref 32–36)
MCV RBC AUTO: 98.8 FL — SIGNIFICANT CHANGE UP (ref 80–100)
MONOCYTES # BLD AUTO: 0.5 K/UL — SIGNIFICANT CHANGE UP (ref 0–0.9)
MONOCYTES NFR BLD AUTO: 18 % — HIGH (ref 2–14)
NEUTROPHILS # BLD AUTO: 1.1 K/UL — LOW (ref 1.8–7.4)
NEUTROPHILS NFR BLD AUTO: 36 % — LOW (ref 43–77)
PLAT MORPH BLD: NORMAL — SIGNIFICANT CHANGE UP
PLATELET # BLD AUTO: 146 K/UL — LOW (ref 150–400)
RBC # BLD: 2.98 M/UL — LOW (ref 3.8–5.2)
RBC # FLD: 13.8 % — SIGNIFICANT CHANGE UP (ref 10.3–14.5)
RBC BLD AUTO: SIGNIFICANT CHANGE UP
WBC # BLD: 2.7 K/UL — LOW (ref 3.8–10.5)
WBC # FLD AUTO: 2.7 K/UL — LOW (ref 3.8–10.5)

## 2018-10-18 PROCEDURE — 99214 OFFICE O/P EST MOD 30 MIN: CPT

## 2018-11-02 ENCOUNTER — LABORATORY RESULT (OUTPATIENT)
Age: 83
End: 2018-11-02

## 2018-11-06 ENCOUNTER — OUTPATIENT (OUTPATIENT)
Dept: OUTPATIENT SERVICES | Facility: HOSPITAL | Age: 83
LOS: 1 days | Discharge: HOME | End: 2018-11-06

## 2018-11-06 DIAGNOSIS — C50.911 MALIGNANT NEOPLASM OF UNSPECIFIED SITE OF RIGHT FEMALE BREAST: ICD-10-CM

## 2018-11-06 DIAGNOSIS — C79.51 SECONDARY MALIGNANT NEOPLASM OF BONE: ICD-10-CM

## 2018-11-06 DIAGNOSIS — Z98.89 OTHER SPECIFIED POSTPROCEDURAL STATES: Chronic | ICD-10-CM

## 2018-11-08 ENCOUNTER — LABORATORY RESULT (OUTPATIENT)
Age: 83
End: 2018-11-08

## 2018-11-09 ENCOUNTER — MESSAGE (OUTPATIENT)
Age: 83
End: 2018-11-09

## 2018-11-15 ENCOUNTER — APPOINTMENT (OUTPATIENT)
Dept: HEMATOLOGY ONCOLOGY | Facility: CLINIC | Age: 83
End: 2018-11-15
Payer: MEDICARE

## 2018-11-15 ENCOUNTER — APPOINTMENT (OUTPATIENT)
Dept: INFUSION THERAPY | Facility: HOSPITAL | Age: 83
End: 2018-11-15

## 2018-11-15 ENCOUNTER — RESULT REVIEW (OUTPATIENT)
Age: 83
End: 2018-11-15

## 2018-11-15 VITALS
WEIGHT: 109.57 LBS | SYSTOLIC BLOOD PRESSURE: 160 MMHG | BODY MASS INDEX: 22.66 KG/M2 | HEART RATE: 66 BPM | DIASTOLIC BLOOD PRESSURE: 75 MMHG | OXYGEN SATURATION: 100 % | TEMPERATURE: 98.4 F | RESPIRATION RATE: 16 BRPM

## 2018-11-15 DIAGNOSIS — G47.00 INSOMNIA, UNSPECIFIED: ICD-10-CM

## 2018-11-15 LAB
BASOPHILS # BLD AUTO: 0 K/UL — SIGNIFICANT CHANGE UP (ref 0–0.2)
BASOPHILS NFR BLD AUTO: 1.3 % — SIGNIFICANT CHANGE UP (ref 0–2)
EOSINOPHIL # BLD AUTO: 0.1 K/UL — SIGNIFICANT CHANGE UP (ref 0–0.5)
EOSINOPHIL NFR BLD AUTO: 1.6 % — SIGNIFICANT CHANGE UP (ref 0–6)
HCT VFR BLD CALC: 36.6 % — SIGNIFICANT CHANGE UP (ref 34.5–45)
HGB BLD-MCNC: 10.1 G/DL — LOW (ref 11.5–15.5)
LYMPHOCYTES # BLD AUTO: 0.9 K/UL — LOW (ref 1–3.3)
LYMPHOCYTES # BLD AUTO: 26.4 % — SIGNIFICANT CHANGE UP (ref 13–44)
MCHC RBC-ENTMCNC: 27.6 PG — SIGNIFICANT CHANGE UP (ref 27–34)
MCHC RBC-ENTMCNC: 27.7 G/DL — LOW (ref 32–36)
MCV RBC AUTO: 99.7 FL — SIGNIFICANT CHANGE UP (ref 80–100)
MONOCYTES # BLD AUTO: 0.7 K/UL — SIGNIFICANT CHANGE UP (ref 0–0.9)
MONOCYTES NFR BLD AUTO: 20.6 % — HIGH (ref 2–14)
NEUTROPHILS # BLD AUTO: 1.7 K/UL — LOW (ref 1.8–7.4)
NEUTROPHILS NFR BLD AUTO: 50.1 % — SIGNIFICANT CHANGE UP (ref 43–77)
PLATELET # BLD AUTO: 133 K/UL — LOW (ref 150–400)
RBC # BLD: 3.68 M/UL — LOW (ref 3.8–5.2)
RBC # FLD: 13.2 % — SIGNIFICANT CHANGE UP (ref 10.3–14.5)
WBC # BLD: 3.3 K/UL — LOW (ref 3.8–10.5)
WBC # FLD AUTO: 3.3 K/UL — LOW (ref 3.8–10.5)

## 2018-11-15 PROCEDURE — 99214 OFFICE O/P EST MOD 30 MIN: CPT

## 2018-11-15 NOTE — REVIEW OF SYSTEMS
[SOB on Exertion] : shortness of breath during exertion [Anxiety] : anxiety [Depression] : depression [Negative] : Psychiatric [Mucosal Pain] : no mucosal pain [Shortness Of Breath] : no shortness of breath [Insomnia] : no insomnia [FreeTextEntry2] : Fatigue is unchanged.. S/P flu vaccination 10/9/2018. [FreeTextEntry4] : Had dental exam and cleaning 7/26/2018. [FreeTextEntry6] : No resting dyspnea. Unchanged THOMAS going up stairs making her bed. [FreeTextEntry8] : Last GYN exam 04/14/2017, no bleeding. [FreeTextEntry9] : See interval history. [de-identified] : Numbness of right hand in the AM. Forgeetful at atimes. [de-identified] : ' I was born worrying".

## 2018-11-15 NOTE — PHYSICAL EXAM
[Restricted in physically strenuous activity but ambulatory and able to carry out work of a light or sedentary nature] : Status 1- Restricted in physically strenuous activity but ambulatory and able to carry out work of a light or sedentary nature, e.g., light house work, office work [Thin] : thin [Normal] : grossly intact [de-identified] : Right breast: scars LOQ and Axilla, no breast mass. Left breast: no mass [de-identified] : Appears more lively today.

## 2018-11-15 NOTE — HISTORY OF PRESENT ILLNESS
[Disease: _____________________] : Disease: [unfilled] [T: ___] : T[unfilled] [N: ___] : N[unfilled] [M: ___] : M[unfilled] [AJCC Stage: ____] : AJCC Stage: [unfilled] [Treatment Protocol] : Treatment Protocol [Therapy: ___] : Therapy: [unfilled] [de-identified] : The patient presented in 2002, at age 74, with an abnormal screening mammogram of the right breast.\par \par \par \par Ultrasound-guided core biopsy on July 18, 2002 demonstrated infiltrating lobular carcinoma which was ER positive (90%), PA positive (60%) and HER-2/yony positive (3+ by IHC).\par \par \par \par Dr. Mai Brown performed lumpectomy and sentinel lymph node biopsy on August 12, 2002. Pathology was positive for a 1.5 cm infiltrating lobular carcinoma with negative margins. The tumor was also ER positive (90%), PA positive (70%) and HER-2/yony 3+. 1/2 sentinel lymph nodes was positive for metastatic disease. The patient underwent completion axillary lymph node dissection in September 9, 2002 which demonstrated 26 negative nodes.\par \par \par \par The patient received 5 cycles of adjuvant AC chemotherapy extending from November 4, 2002 through January 27, 2002. She received a cumulative dose of 490 mg (300 mg/meter squared) of Adriamycin.\par \par \par \par [place] [facility] [place] the supervision of Dr. Francia Mckeon.\par \par \par \par The patient subsequently took anastrozole from May 2003 through June 2008.\par \par \par \par The patient was found to have bone metastases on CT scan of her spine in January 2016. She initiated treatment with anastrozole and Xgeva in February 2016. Anastrozole was discontinued on 5/17/2017 because of progression in bone and fulvestrant was initiated on that date. [de-identified] : Infiltrating lobular carcinoma ER positive IL positive HER-2/yony positive [FreeTextEntry1] : CAF x 5 11/4/2002 - 1/27/2003\par Anastrozole 5/2003 - 6/2008, Resumed anastrozole 2/22/2016 - 5/17/2017\par Xgeva start 2/26/2016\par Fulvestrant start 5/17/2017.\par Ibrance start 4/19/2018 [de-identified] : The patient is 16  years 5   months  post initial diagnosis of breast cancer  and  2 years 5  months  since diagnosis of bone metastases.\par \par Started  fulvestrant 1 year 6  months ago.\par \par Ibrance was added to her regimen 7 months ago.\par \par The patient held Ibrance 75 mg after day 14 (11/2/2018), Ibrance held secondary to low ANC, off for 13 days. Today (11/15/2018) ANC is 1700.\par \par Saw PCP Dr Baez on 10/22 to have thyroid checked.\par \par 10/26/2018 went to Urgent Care with pain and swelling left ankle.X ray demonstrated DJD, no acute changes, pained resolved without intervention.\par \par Main complaint remains fatigue and left hip pain going into left leg.\par \par On 9/7/2018 CA 27- 29 = 96.1  was 90.5 on 8/22/2018 was 103.7 on 8/09/2018 was  73.0 on 7/16/2018 (was 74.4 in June 2018).\par \par tolerating Ibrance..\par \par Has mild pain low back radiating to left leg, improved.\par \par The patient has been on Xgeva x 2 years 6  months . The patient  received  Xgeva and Faslodex earlier  today. \par \par Had f/u PET CT dated  9/08/2018,  stable or improved and may represent  ongoing response to therapy when compared with previous study of 3/17/2018.\par \par No other interval events since last visit.

## 2018-11-29 ENCOUNTER — LABORATORY RESULT (OUTPATIENT)
Age: 83
End: 2018-11-29

## 2018-12-10 ENCOUNTER — OUTPATIENT (OUTPATIENT)
Dept: OUTPATIENT SERVICES | Facility: HOSPITAL | Age: 83
LOS: 1 days | Discharge: HOME | End: 2018-12-10

## 2018-12-10 DIAGNOSIS — C50.911 MALIGNANT NEOPLASM OF UNSPECIFIED SITE OF RIGHT FEMALE BREAST: ICD-10-CM

## 2018-12-10 DIAGNOSIS — Z98.89 OTHER SPECIFIED POSTPROCEDURAL STATES: Chronic | ICD-10-CM

## 2018-12-13 ENCOUNTER — APPOINTMENT (OUTPATIENT)
Dept: INFUSION THERAPY | Facility: HOSPITAL | Age: 83
End: 2018-12-13

## 2018-12-13 ENCOUNTER — RESULT REVIEW (OUTPATIENT)
Age: 83
End: 2018-12-13

## 2018-12-13 ENCOUNTER — APPOINTMENT (OUTPATIENT)
Dept: HEMATOLOGY ONCOLOGY | Facility: CLINIC | Age: 83
End: 2018-12-13
Payer: MEDICARE

## 2018-12-13 VITALS
SYSTOLIC BLOOD PRESSURE: 150 MMHG | OXYGEN SATURATION: 97 % | WEIGHT: 106.7 LBS | TEMPERATURE: 98 F | BODY MASS INDEX: 22.06 KG/M2 | HEART RATE: 64 BPM | RESPIRATION RATE: 16 BRPM | DIASTOLIC BLOOD PRESSURE: 80 MMHG

## 2018-12-13 LAB
EOSINOPHIL # BLD AUTO: 0.1 K/UL — SIGNIFICANT CHANGE UP (ref 0–0.5)
HCT VFR BLD CALC: 29.8 % — LOW (ref 34.5–45)
HGB BLD-MCNC: 9.8 G/DL — LOW (ref 11.5–15.5)
LYMPHOCYTES # BLD AUTO: 1.1 K/UL — SIGNIFICANT CHANGE UP (ref 1–3.3)
LYMPHOCYTES # BLD AUTO: 43 % — SIGNIFICANT CHANGE UP (ref 13–44)
MCHC RBC-ENTMCNC: 32.4 PG — SIGNIFICANT CHANGE UP (ref 27–34)
MCHC RBC-ENTMCNC: 32.9 G/DL — SIGNIFICANT CHANGE UP (ref 32–36)
MCV RBC AUTO: 98.4 FL — SIGNIFICANT CHANGE UP (ref 80–100)
MONOCYTES # BLD AUTO: 0.5 K/UL — SIGNIFICANT CHANGE UP (ref 0–0.9)
MONOCYTES NFR BLD AUTO: 13 % — SIGNIFICANT CHANGE UP (ref 2–14)
NEUTROPHILS # BLD AUTO: 1.2 K/UL — LOW (ref 1.8–7.4)
NEUTROPHILS NFR BLD AUTO: 44 % — SIGNIFICANT CHANGE UP (ref 43–77)
PLAT MORPH BLD: NORMAL — SIGNIFICANT CHANGE UP
PLATELET # BLD AUTO: 166 K/UL — SIGNIFICANT CHANGE UP (ref 150–400)
RBC # BLD: 3.03 M/UL — LOW (ref 3.8–5.2)
RBC # FLD: 13.8 % — SIGNIFICANT CHANGE UP (ref 10.3–14.5)
RBC BLD AUTO: SIGNIFICANT CHANGE UP
WBC # BLD: 2.8 K/UL — LOW (ref 3.8–10.5)
WBC # FLD AUTO: 2.8 K/UL — LOW (ref 3.8–10.5)

## 2018-12-13 PROCEDURE — 99214 OFFICE O/P EST MOD 30 MIN: CPT

## 2018-12-14 DIAGNOSIS — C79.51 SECONDARY MALIGNANT NEOPLASM OF BONE: ICD-10-CM

## 2018-12-27 ENCOUNTER — LABORATORY RESULT (OUTPATIENT)
Age: 83
End: 2018-12-27

## 2019-01-01 ENCOUNTER — RESULT REVIEW (OUTPATIENT)
Age: 84
End: 2019-01-01

## 2019-01-01 ENCOUNTER — LABORATORY RESULT (OUTPATIENT)
Age: 84
End: 2019-01-01

## 2019-01-01 ENCOUNTER — APPOINTMENT (OUTPATIENT)
Dept: INFUSION THERAPY | Facility: HOSPITAL | Age: 84
End: 2019-01-01

## 2019-01-01 ENCOUNTER — OTHER (OUTPATIENT)
Age: 84
End: 2019-01-01

## 2019-01-01 ENCOUNTER — OUTPATIENT (OUTPATIENT)
Dept: OUTPATIENT SERVICES | Facility: HOSPITAL | Age: 84
LOS: 1 days | End: 2019-01-01
Payer: MEDICARE

## 2019-01-01 ENCOUNTER — OUTPATIENT (OUTPATIENT)
Dept: OUTPATIENT SERVICES | Facility: HOSPITAL | Age: 84
LOS: 1 days | Discharge: HOME | End: 2019-01-01

## 2019-01-01 ENCOUNTER — APPOINTMENT (OUTPATIENT)
Dept: HEMATOLOGY ONCOLOGY | Facility: CLINIC | Age: 84
End: 2019-01-01
Payer: MEDICARE

## 2019-01-01 ENCOUNTER — APPOINTMENT (OUTPATIENT)
Dept: DERMATOLOGY | Facility: CLINIC | Age: 84
End: 2019-01-01

## 2019-01-01 ENCOUNTER — APPOINTMENT (OUTPATIENT)
Dept: HEMATOLOGY ONCOLOGY | Facility: CLINIC | Age: 84
End: 2019-01-01

## 2019-01-01 VITALS
RESPIRATION RATE: 17 BRPM | SYSTOLIC BLOOD PRESSURE: 138 MMHG | WEIGHT: 106.7 LBS | DIASTOLIC BLOOD PRESSURE: 76 MMHG | BODY MASS INDEX: 22.06 KG/M2 | HEART RATE: 67 BPM | OXYGEN SATURATION: 98 % | TEMPERATURE: 98.6 F

## 2019-01-01 VITALS
DIASTOLIC BLOOD PRESSURE: 84 MMHG | HEART RATE: 72 BPM | TEMPERATURE: 98.8 F | BODY MASS INDEX: 22.57 KG/M2 | OXYGEN SATURATION: 98 % | RESPIRATION RATE: 17 BRPM | SYSTOLIC BLOOD PRESSURE: 135 MMHG | WEIGHT: 109.13 LBS

## 2019-01-01 VITALS
SYSTOLIC BLOOD PRESSURE: 142 MMHG | RESPIRATION RATE: 17 BRPM | OXYGEN SATURATION: 98 % | DIASTOLIC BLOOD PRESSURE: 78 MMHG | WEIGHT: 108.03 LBS | HEART RATE: 72 BPM | BODY MASS INDEX: 22.34 KG/M2 | TEMPERATURE: 98.7 F

## 2019-01-01 VITALS
OXYGEN SATURATION: 100 % | WEIGHT: 110.23 LBS | HEART RATE: 67 BPM | SYSTOLIC BLOOD PRESSURE: 147 MMHG | TEMPERATURE: 97.4 F | BODY MASS INDEX: 22.79 KG/M2 | RESPIRATION RATE: 17 BRPM | DIASTOLIC BLOOD PRESSURE: 61 MMHG

## 2019-01-01 DIAGNOSIS — C79.51 SECONDARY MALIGNANT NEOPLASM OF BONE: ICD-10-CM

## 2019-01-01 DIAGNOSIS — Z98.89 OTHER SPECIFIED POSTPROCEDURAL STATES: Chronic | ICD-10-CM

## 2019-01-01 DIAGNOSIS — R21 RASH AND OTHER NONSPECIFIC SKIN ERUPTION: ICD-10-CM

## 2019-01-01 DIAGNOSIS — Z51.89 ENCOUNTER FOR OTHER SPECIFIED AFTERCARE: ICD-10-CM

## 2019-01-01 DIAGNOSIS — C50.911 MALIGNANT NEOPLASM OF UNSPECIFIED SITE OF RIGHT FEMALE BREAST: ICD-10-CM

## 2019-01-01 LAB
ALBUMIN SERPL ELPH-MCNC: 3.7 G/DL
ALP BLD-CCNC: 143 U/L
ALT SERPL-CCNC: 13 U/L
ANION GAP SERPL CALC-SCNC: 12 MMOL/L
AST SERPL-CCNC: 22 U/L
BASOPHILS # BLD AUTO: 0 K/UL — SIGNIFICANT CHANGE UP (ref 0–0.2)
BASOPHILS # BLD AUTO: 0 K/UL — SIGNIFICANT CHANGE UP (ref 0–0.2)
BASOPHILS # BLD AUTO: 0.09 K/UL
BASOPHILS NFR BLD AUTO: 0.4 % — SIGNIFICANT CHANGE UP (ref 0–2)
BASOPHILS NFR BLD AUTO: 1 % — SIGNIFICANT CHANGE UP (ref 0–2)
BASOPHILS NFR BLD AUTO: 1.8 %
BILIRUB SERPL-MCNC: 0.3 MG/DL
BLD GP AB SCN SERPL QL: NEGATIVE — SIGNIFICANT CHANGE UP
BLD GP AB SCN SERPL QL: NEGATIVE — SIGNIFICANT CHANGE UP
BUN SERPL-MCNC: 26 MG/DL
CALCIUM SERPL-MCNC: 9.1 MG/DL
CANCER AG27-29 SERPL-ACNC: 171.3 U/ML
CEA SERPL-MCNC: 9.4 NG/ML
CHLORIDE SERPL-SCNC: 104 MMOL/L
CO2 SERPL-SCNC: 25 MMOL/L
CREAT SERPL-MCNC: 0.93 MG/DL
EOSINOPHIL # BLD AUTO: 0 K/UL
EOSINOPHIL # BLD AUTO: 0 K/UL — SIGNIFICANT CHANGE UP (ref 0–0.5)
EOSINOPHIL # BLD AUTO: 0 K/UL — SIGNIFICANT CHANGE UP (ref 0–0.5)
EOSINOPHIL NFR BLD AUTO: 0 %
EOSINOPHIL NFR BLD AUTO: 0.8 % — SIGNIFICANT CHANGE UP (ref 0–6)
GLUCOSE SERPL-MCNC: 100 MG/DL
HCT VFR BLD CALC: 24.4 % — LOW (ref 34.5–45)
HCT VFR BLD CALC: 25 % — LOW (ref 34.5–45)
HCT VFR BLD CALC: 30.5 %
HCT VFR BLD CALC: 33.6 % — LOW (ref 34.5–45)
HGB BLD-MCNC: 10.4 G/DL — LOW (ref 11.5–15.5)
HGB BLD-MCNC: 8.2 G/DL — LOW (ref 11.5–15.5)
HGB BLD-MCNC: 8.5 G/DL — LOW (ref 11.5–15.5)
HGB BLD-MCNC: 9.5 G/DL
IRON SATN MFR SERPL: 29 %
IRON SERPL-MCNC: 73 UG/DL
LYMPHOCYTES # BLD AUTO: 1.48 K/UL
LYMPHOCYTES # BLD AUTO: 1.5 K/UL — SIGNIFICANT CHANGE UP (ref 1–3.3)
LYMPHOCYTES # BLD AUTO: 1.6 K/UL — SIGNIFICANT CHANGE UP (ref 1–3.3)
LYMPHOCYTES # BLD AUTO: 30 % — SIGNIFICANT CHANGE UP (ref 13–44)
LYMPHOCYTES # BLD AUTO: 38.6 % — SIGNIFICANT CHANGE UP (ref 13–44)
LYMPHOCYTES NFR BLD AUTO: 28.3 %
MAN DIFF?: NORMAL
MCHC RBC-ENTMCNC: 30.9 G/DL — LOW (ref 32–36)
MCHC RBC-ENTMCNC: 31.1 GM/DL
MCHC RBC-ENTMCNC: 31.2 PG — SIGNIFICANT CHANGE UP (ref 27–34)
MCHC RBC-ENTMCNC: 32 PG — SIGNIFICANT CHANGE UP (ref 27–34)
MCHC RBC-ENTMCNC: 32.5 PG
MCHC RBC-ENTMCNC: 33.3 PG — SIGNIFICANT CHANGE UP (ref 27–34)
MCHC RBC-ENTMCNC: 33.6 G/DL — SIGNIFICANT CHANGE UP (ref 32–36)
MCHC RBC-ENTMCNC: 34 G/DL — SIGNIFICANT CHANGE UP (ref 32–36)
MCV RBC AUTO: 101 FL — HIGH (ref 80–100)
MCV RBC AUTO: 104.5 FL
MCV RBC AUTO: 95.3 FL — SIGNIFICANT CHANGE UP (ref 80–100)
MCV RBC AUTO: 97.8 FL — SIGNIFICANT CHANGE UP (ref 80–100)
METAMYELOCYTES # FLD: 1 % — HIGH (ref 0–0)
MONOCYTES # BLD AUTO: 0.6 K/UL — SIGNIFICANT CHANGE UP (ref 0–0.9)
MONOCYTES # BLD AUTO: 0.65 K/UL
MONOCYTES # BLD AUTO: 0.7 K/UL — SIGNIFICANT CHANGE UP (ref 0–0.9)
MONOCYTES NFR BLD AUTO: 12.4 %
MONOCYTES NFR BLD AUTO: 16.5 % — HIGH (ref 2–14)
MONOCYTES NFR BLD AUTO: 19 % — HIGH (ref 2–14)
MYELOCYTES NFR BLD: 3 % — HIGH (ref 0–0)
NEUTROPHILS # BLD AUTO: 1.7 K/UL — LOW (ref 1.8–7.4)
NEUTROPHILS # BLD AUTO: 1.9 K/UL — SIGNIFICANT CHANGE UP (ref 1.8–7.4)
NEUTROPHILS # BLD AUTO: 2.92 K/UL
NEUTROPHILS NFR BLD AUTO: 43.6 % — SIGNIFICANT CHANGE UP (ref 43–77)
NEUTROPHILS NFR BLD AUTO: 45 % — SIGNIFICANT CHANGE UP (ref 43–77)
NEUTROPHILS NFR BLD AUTO: 47.8 %
NRBC # BLD: 1 /100 — HIGH (ref 0–0)
PLAT MORPH BLD: NORMAL — SIGNIFICANT CHANGE UP
PLATELET # BLD AUTO: 128 K/UL — LOW (ref 150–400)
PLATELET # BLD AUTO: 137 K/UL — LOW (ref 150–400)
PLATELET # BLD AUTO: 151 K/UL — SIGNIFICANT CHANGE UP (ref 150–400)
PLATELET # BLD AUTO: 175 K/UL
POTASSIUM SERPL-SCNC: 4.8 MMOL/L
PROMYELOCYTES # FLD: 1 % — HIGH (ref 0–0)
PROT SERPL-MCNC: 6.3 G/DL
RBC # BLD: 2.56 M/UL — LOW (ref 3.8–5.2)
RBC # BLD: 2.57 M/UL — LOW (ref 3.8–5.2)
RBC # BLD: 2.92 M/UL
RBC # BLD: 3.33 M/UL — LOW (ref 3.8–5.2)
RBC # FLD: 16.5 % — HIGH (ref 10.3–14.5)
RBC # FLD: 17.4 % — HIGH (ref 10.3–14.5)
RBC # FLD: 17.9 % — HIGH (ref 10.3–14.5)
RBC # FLD: 19.9 %
RBC BLD AUTO: SIGNIFICANT CHANGE UP
RH IG SCN BLD-IMP: POSITIVE — SIGNIFICANT CHANGE UP
RH IG SCN BLD-IMP: POSITIVE — SIGNIFICANT CHANGE UP
SODIUM SERPL-SCNC: 141 MMOL/L
TIBC SERPL-MCNC: 256 UG/DL
TSH SERPL-ACNC: 1.65 UIU/ML
UIBC SERPL-MCNC: 183 UG/DL
WBC # BLD: 3.8 K/UL — SIGNIFICANT CHANGE UP (ref 3.8–10.5)
WBC # BLD: 4.1 K/UL — SIGNIFICANT CHANGE UP (ref 3.8–10.5)
WBC # BLD: 5.3 K/UL — SIGNIFICANT CHANGE UP (ref 3.8–10.5)
WBC # FLD AUTO: 3.8 K/UL — SIGNIFICANT CHANGE UP (ref 3.8–10.5)
WBC # FLD AUTO: 4.1 K/UL — SIGNIFICANT CHANGE UP (ref 3.8–10.5)
WBC # FLD AUTO: 5.24 K/UL
WBC # FLD AUTO: 5.3 K/UL — SIGNIFICANT CHANGE UP (ref 3.8–10.5)

## 2019-01-01 PROCEDURE — 99214 OFFICE O/P EST MOD 30 MIN: CPT

## 2019-01-01 PROCEDURE — 99215 OFFICE O/P EST HI 40 MIN: CPT

## 2019-01-01 PROCEDURE — 86901 BLOOD TYPING SEROLOGIC RH(D): CPT

## 2019-01-01 PROCEDURE — 86923 COMPATIBILITY TEST ELECTRIC: CPT

## 2019-01-01 PROCEDURE — 86900 BLOOD TYPING SEROLOGIC ABO: CPT

## 2019-01-01 PROCEDURE — 86850 RBC ANTIBODY SCREEN: CPT

## 2019-01-01 RX ORDER — POLYMYXIN B SULFATE AND TRIMETHOPRIM SULFATE 100000; 1 [USP'U]/ML; MG/ML
10000-0.1 SOLUTION/ DROPS OPHTHALMIC
Refills: 0 | Status: DISCONTINUED | COMMUNITY
Start: 2019-01-01 | End: 2019-01-01

## 2019-01-01 RX ORDER — HYPOCHLOROUS ACID/SODIUM CHLOR 0.01 %
0.01 SPRAY, NON-AEROSOL (ML) TOPICAL
Refills: 0 | Status: ACTIVE | COMMUNITY
Start: 2019-01-01

## 2019-01-07 ENCOUNTER — OUTPATIENT (OUTPATIENT)
Dept: OUTPATIENT SERVICES | Facility: HOSPITAL | Age: 84
LOS: 1 days | Discharge: HOME | End: 2019-01-07

## 2019-01-07 DIAGNOSIS — C79.51 SECONDARY MALIGNANT NEOPLASM OF BONE: ICD-10-CM

## 2019-01-07 DIAGNOSIS — C50.911 MALIGNANT NEOPLASM OF UNSPECIFIED SITE OF RIGHT FEMALE BREAST: ICD-10-CM

## 2019-01-07 DIAGNOSIS — Z98.89 OTHER SPECIFIED POSTPROCEDURAL STATES: Chronic | ICD-10-CM

## 2019-01-10 ENCOUNTER — APPOINTMENT (OUTPATIENT)
Dept: INFUSION THERAPY | Facility: HOSPITAL | Age: 84
End: 2019-01-10

## 2019-01-10 ENCOUNTER — RESULT REVIEW (OUTPATIENT)
Age: 84
End: 2019-01-10

## 2019-01-10 ENCOUNTER — APPOINTMENT (OUTPATIENT)
Dept: HEMATOLOGY ONCOLOGY | Facility: CLINIC | Age: 84
End: 2019-01-10
Payer: MEDICARE

## 2019-01-10 VITALS
SYSTOLIC BLOOD PRESSURE: 154 MMHG | WEIGHT: 108.03 LBS | DIASTOLIC BLOOD PRESSURE: 75 MMHG | HEART RATE: 58 BPM | OXYGEN SATURATION: 100 % | RESPIRATION RATE: 16 BRPM | BODY MASS INDEX: 22.34 KG/M2 | TEMPERATURE: 98.1 F

## 2019-01-10 DIAGNOSIS — D72.819 DECREASED WHITE BLOOD CELL COUNT, UNSPECIFIED: ICD-10-CM

## 2019-01-10 LAB
BASOPHILS # BLD AUTO: 0 K/UL — SIGNIFICANT CHANGE UP (ref 0–0.2)
BASOPHILS NFR BLD AUTO: 1 % — SIGNIFICANT CHANGE UP (ref 0–2)
EOSINOPHIL # BLD AUTO: 0 K/UL — SIGNIFICANT CHANGE UP (ref 0–0.5)
HCT VFR BLD CALC: 29 % — LOW (ref 34.5–45)
HGB BLD-MCNC: 9.6 G/DL — LOW (ref 11.5–15.5)
LYMPHOCYTES # BLD AUTO: 1 K/UL — SIGNIFICANT CHANGE UP (ref 1–3.3)
LYMPHOCYTES # BLD AUTO: 41 % — SIGNIFICANT CHANGE UP (ref 13–44)
MCHC RBC-ENTMCNC: 32.9 PG — SIGNIFICANT CHANGE UP (ref 27–34)
MCHC RBC-ENTMCNC: 33.2 G/DL — SIGNIFICANT CHANGE UP (ref 32–36)
MCV RBC AUTO: 99.1 FL — SIGNIFICANT CHANGE UP (ref 80–100)
MONOCYTES # BLD AUTO: 0.4 K/UL — SIGNIFICANT CHANGE UP (ref 0–0.9)
MONOCYTES NFR BLD AUTO: 23 % — HIGH (ref 2–14)
NEUTROPHILS # BLD AUTO: 0.8 K/UL — LOW (ref 1.8–7.4)
NEUTROPHILS NFR BLD AUTO: 35 % — LOW (ref 43–77)
PLAT MORPH BLD: NORMAL — SIGNIFICANT CHANGE UP
PLATELET # BLD AUTO: 185 K/UL — SIGNIFICANT CHANGE UP (ref 150–400)
RBC # BLD: 2.93 M/UL — LOW (ref 3.8–5.2)
RBC # FLD: 15 % — HIGH (ref 10.3–14.5)
RBC BLD AUTO: SIGNIFICANT CHANGE UP
WBC # BLD: 2.3 K/UL — LOW (ref 3.8–10.5)
WBC # FLD AUTO: 2.3 K/UL — LOW (ref 3.8–10.5)

## 2019-01-10 PROCEDURE — 99215 OFFICE O/P EST HI 40 MIN: CPT

## 2019-01-10 RX ORDER — PALBOCICLIB 75 MG/1
75 CAPSULE ORAL DAILY
Qty: 21 | Refills: 0 | Status: DISCONTINUED | COMMUNITY
Start: 2018-11-15 | End: 2019-01-10

## 2019-01-10 NOTE — HISTORY OF PRESENT ILLNESS
[Disease: _____________________] : Disease: [unfilled] [T: ___] : T[unfilled] [N: ___] : N[unfilled] [M: ___] : M[unfilled] [AJCC Stage: ____] : AJCC Stage: [unfilled] [Treatment Protocol] : Treatment Protocol [Therapy: ___] : Therapy: [unfilled] [de-identified] : The patient presented in 2002, at age 74, with an abnormal screening mammogram of the right breast.\par \par \par \par Ultrasound-guided core biopsy on July 18, 2002 demonstrated infiltrating lobular carcinoma which was ER positive (90%), ID positive (60%) and HER-2/yony positive (3+ by IHC).\par \par \par \par Dr. Mai Brown performed lumpectomy and sentinel lymph node biopsy on August 12, 2002. Pathology was positive for a 1.5 cm infiltrating lobular carcinoma with negative margins. The tumor was also ER positive (90%), ID positive (70%) and HER-2/yony 3+. 1/2 sentinel lymph nodes was positive for metastatic disease. The patient underwent completion axillary lymph node dissection in September 9, 2002 which demonstrated 26 negative nodes.\par \par \par \par The patient received 5 cycles of adjuvant AC chemotherapy extending from November 4, 2002 through January 27, 2002. She received a cumulative dose of 490 mg (300 mg/meter squared) of Adriamycin.\par \par \par \par [place] [facility] [place] supervision of Dr. Francia Mckeon.\par \par \par \par The patient subsequently took anastrozole from May 2003 through June 2008.\par \par \par \par The patient was found to have bone metastases on CT scan of her spine in January 2016. She initiated treatment with anastrozole and Xgeva in February 2016. Anastrozole was discontinued on 5/17/2017 because of progression in bone and fulvestrant was initiated on that date. [de-identified] : Infiltrating lobular carcinoma ER positive MO positive HER-2/yony positive [FreeTextEntry1] : CAF x 5 11/4/2002 - 1/27/2003\par Anastrozole 5/2003 - 6/2008, Resumed anastrozole 2/22/2016 - 5/17/2017\par Xgeva start 2/26/2016\par Fulvestrant start 5/17/2017.\par Ibrance start 4/19/2018 [de-identified] : The patient is 16  years 7 months  post initial diagnosis of breast cancer  and  2 years 7  months  since diagnosis of bone metastases.\par \par Started  fulvestrant 1 year 8  months ago.\par \par Ibrance was added to her regimen 9 months ago.\par \par The patient  initiated current cycle Ibrance at 75mg  12/14/2018 and completed it 1/4/2019. Previous cycle was initiated on schedule. Today WBC is 2300 and ANC is 800.\par \par Saw dentist yesterday for cleaning..\par \par Main complaint remains fatigue.  Occasional pain in back going into left leg.\par \par Has mild pain low back radiating to left leg, improved.\par \par The patient has been on Xgeva x 2 years 7 months . The patient  received  Xgeva and Faslodex earlier  today. \par \par Most recent PET CT dated  9/08/2018,  stable or improved when compared with previous study of 3/17/2018.\par \par Saw cardiologist Lolly Estrada 11/27/2018, had echo with EF 65%. The patient is also scheduled 01/11/2018 [facility] Ultrasound. Next f/u 01/2018 with Bernard Crum\par \par No other interval events since last visit.

## 2019-01-10 NOTE — HISTORY OF PRESENT ILLNESS
[Disease: _____________________] : Disease: [unfilled] [T: ___] : T[unfilled] [N: ___] : N[unfilled] [M: ___] : M[unfilled] [AJCC Stage: ____] : AJCC Stage: [unfilled] [Treatment Protocol] : Treatment Protocol [Therapy: ___] : Therapy: [unfilled] [de-identified] : The patient presented in 2002, at age 74, with an abnormal screening mammogram of the right breast.\par \par \par \par Ultrasound-guided core biopsy on July 18, 2002 demonstrated infiltrating lobular carcinoma which was ER positive (90%), MO positive (60%) and HER-2/yony positive (3+ by IHC).\par \par \par \par Dr. Mai Brown performed lumpectomy and sentinel lymph node biopsy on August 12, 2002. Pathology was positive for a 1.5 cm infiltrating lobular carcinoma with negative margins. The tumor was also ER positive (90%), MO positive (70%) and HER-2/yony 3+. 1/2 sentinel lymph nodes was positive for metastatic disease. The patient underwent completion axillary lymph node dissection in September 9, 2002 which demonstrated 26 negative nodes.\par \par \par \par The patient received 5 cycles of adjuvant AC chemotherapy extending from November 4, 2002 through January 27, 2002. She received a cumulative dose of 490 mg (300 mg/meter squared) of Adriamycin.\par \par \par \par The patient received radiation therapy [facility] in [place] under the supervision of Dr. Francia Mckeon.\par \par \par \par The patient subsequently took anastrozole from May 2003 through June 2008.\par \par \par \par The patient was found to have bone metastases on CT scan of her spine in January 2016. She initiated treatment with anastrozole and Xgeva in February 2016. Anastrozole was discontinued on 5/17/2017 because of progression in bone and fulvestrant was initiated on that date. [de-identified] : Infiltrating lobular carcinoma ER positive ID positive HER-2/yony positive [FreeTextEntry1] : CAF x 5 11/4/2002 - 1/27/2003\par Anastrozole 5/2003 - 6/2008, Resumed anastrozole 2/22/2016 - 5/17/2017\par Xgeva start 2/26/2016\par Fulvestrant start 5/17/2017.\par Ibrance start 4/19/2018 [de-identified] : The patient is 16  years 6   months  post initial diagnosis of breast cancer  and  2 years 6  months  since diagnosis of bone metastases.\par \par Started  fulvestrant 1 year 7  months ago.\par \par Ibrance was added to her regimen 8 months ago.\par \par The patient  initiated current cycle Ibrance at 75mg  11/16/2018  and completed 12/6/2018. Today is D28/28. Today ANC = 1200. The patient will initiate new cycle  tomorrow.\par \par Main complaint remains fatigue and left hip pain going into left leg.\par \par \par \par tolerating Ibrance..\par \par Has mild pain low back radiating to left leg, improved.\par \par The patient has been on Xgeva x 2 years 6  months . The patient  received  Xgeva and Faslodex earlier  today. \par \par Had f/u PET CT dated  9/08/2018,  stable or improved and may represent  ongoing response to therapy when compared with previous study of 3/17/2018.\par \par Saw cardiologist Lolly Estrada 11/27/2018, had echo with EF 65%. The patient is also scheduled 01/11/2018 [facility] Ul[facility]ound. Next f/u 01/2018 with Bernard Crum\par \par No other interval events since last visit.

## 2019-01-10 NOTE — REVIEW OF SYSTEMS
[SOB on Exertion] : shortness of breath during exertion [Anxiety] : anxiety [Depression] : depression [Negative] : Allergic/Immunologic [Mucosal Pain] : no mucosal pain [Shortness Of Breath] : no shortness of breath [Insomnia] : no insomnia [FreeTextEntry2] : Fatigue is unchanged.. S/P flu vaccination 10/9/2018. [FreeTextEntry4] : Had dental exam and cleaning 7/26/2018. [FreeTextEntry5] : Saw cardiologist Lolly Estrada 11/27/2018, had echo with EF 65%. [FreeTextEntry6] : No resting dyspnea. Unchanged THOMAS going up stairs making her bed. [FreeTextEntry8] : Last GYN exam 04/14/2017, no bleeding. [FreeTextEntry9] : See interval history. [de-identified] : Numbness of right hand in the AM. Forgetful at times. [de-identified] : ' I was born worrying".

## 2019-01-10 NOTE — REVIEW OF SYSTEMS
[SOB on Exertion] : shortness of breath during exertion [Anxiety] : anxiety [Depression] : depression [Negative] : Allergic/Immunologic [Mucosal Pain] : no mucosal pain [Shortness Of Breath] : no shortness of breath [Insomnia] : no insomnia [FreeTextEntry2] : Fatigue is unchanged.. S/P flu vaccination 10/9/2018. [FreeTextEntry4] : Had dental exam and cleaning 7/26/2018. [FreeTextEntry5] : Saw cardiologist Lolly Estrada 11/27/2018, had echo with EF 65%. [FreeTextEntry6] : No resting dyspnea. Unchanged THOMAS going up stairs making her bed. [FreeTextEntry8] : Last GYN exam 04/14/2017, no bleeding. [FreeTextEntry9] : See interval history. [de-identified] : Numbness of right hand in the AM. Forgetful at times. [de-identified] : ' I was born worrying".

## 2019-01-10 NOTE — PHYSICAL EXAM
[Restricted in physically strenuous activity but ambulatory and able to carry out work of a light or sedentary nature] : Status 1- Restricted in physically strenuous activity but ambulatory and able to carry out work of a light or sedentary nature, e.g., light house work, office work [Thin] : thin [Normal] : affect appropriate [de-identified] : Right breast: scars LOQ and Axilla, no breast mass. Left breast: no mass

## 2019-01-10 NOTE — PHYSICAL EXAM
[Restricted in physically strenuous activity but ambulatory and able to carry out work of a light or sedentary nature] : Status 1- Restricted in physically strenuous activity but ambulatory and able to carry out work of a light or sedentary nature, e.g., light house work, office work [Thin] : thin [Normal] : affect appropriate [de-identified] : Right breast: scars LOQ and Axilla, no breast mass. Left breast: no mass

## 2019-01-16 ENCOUNTER — LABORATORY RESULT (OUTPATIENT)
Age: 84
End: 2019-01-16

## 2019-01-16 ENCOUNTER — RESULT REVIEW (OUTPATIENT)
Age: 84
End: 2019-01-16

## 2019-01-31 ENCOUNTER — LABORATORY RESULT (OUTPATIENT)
Age: 84
End: 2019-01-31

## 2019-01-31 ENCOUNTER — OUTPATIENT (OUTPATIENT)
Dept: OUTPATIENT SERVICES | Facility: HOSPITAL | Age: 84
LOS: 1 days | Discharge: HOME | End: 2019-01-31

## 2019-01-31 DIAGNOSIS — Z98.89 OTHER SPECIFIED POSTPROCEDURAL STATES: Chronic | ICD-10-CM

## 2019-01-31 DIAGNOSIS — C79.51 SECONDARY MALIGNANT NEOPLASM OF BONE: ICD-10-CM

## 2019-01-31 DIAGNOSIS — C50.911 MALIGNANT NEOPLASM OF UNSPECIFIED SITE OF RIGHT FEMALE BREAST: ICD-10-CM

## 2019-02-07 ENCOUNTER — APPOINTMENT (OUTPATIENT)
Dept: INFUSION THERAPY | Facility: HOSPITAL | Age: 84
End: 2019-02-07

## 2019-02-07 ENCOUNTER — APPOINTMENT (OUTPATIENT)
Dept: HEMATOLOGY ONCOLOGY | Facility: CLINIC | Age: 84
End: 2019-02-07
Payer: MEDICARE

## 2019-02-07 VITALS
HEART RATE: 60 BPM | SYSTOLIC BLOOD PRESSURE: 153 MMHG | WEIGHT: 106.92 LBS | DIASTOLIC BLOOD PRESSURE: 68 MMHG | BODY MASS INDEX: 22.11 KG/M2 | RESPIRATION RATE: 16 BRPM | OXYGEN SATURATION: 100 % | TEMPERATURE: 97.9 F

## 2019-02-07 PROCEDURE — 99214 OFFICE O/P EST MOD 30 MIN: CPT

## 2019-02-15 ENCOUNTER — LABORATORY RESULT (OUTPATIENT)
Age: 84
End: 2019-02-15

## 2019-02-15 ENCOUNTER — RX RENEWAL (OUTPATIENT)
Age: 84
End: 2019-02-15

## 2019-02-28 ENCOUNTER — OUTPATIENT (OUTPATIENT)
Dept: OUTPATIENT SERVICES | Facility: HOSPITAL | Age: 84
LOS: 1 days | Discharge: HOME | End: 2019-02-28

## 2019-02-28 DIAGNOSIS — Z98.89 OTHER SPECIFIED POSTPROCEDURAL STATES: Chronic | ICD-10-CM

## 2019-02-28 DIAGNOSIS — C79.51 SECONDARY MALIGNANT NEOPLASM OF BONE: ICD-10-CM

## 2019-02-28 DIAGNOSIS — C50.911 MALIGNANT NEOPLASM OF UNSPECIFIED SITE OF RIGHT FEMALE BREAST: ICD-10-CM

## 2019-03-04 ENCOUNTER — LABORATORY RESULT (OUTPATIENT)
Age: 84
End: 2019-03-04

## 2019-03-04 ENCOUNTER — TRANSCRIPTION ENCOUNTER (OUTPATIENT)
Age: 84
End: 2019-03-04

## 2019-03-07 ENCOUNTER — APPOINTMENT (OUTPATIENT)
Dept: HEMATOLOGY ONCOLOGY | Facility: CLINIC | Age: 84
End: 2019-03-07
Payer: MEDICARE

## 2019-03-07 ENCOUNTER — RESULT REVIEW (OUTPATIENT)
Age: 84
End: 2019-03-07

## 2019-03-07 ENCOUNTER — OUTPATIENT (OUTPATIENT)
Dept: OUTPATIENT SERVICES | Facility: HOSPITAL | Age: 84
LOS: 1 days | End: 2019-03-07
Payer: MEDICARE

## 2019-03-07 ENCOUNTER — APPOINTMENT (OUTPATIENT)
Dept: INFUSION THERAPY | Facility: HOSPITAL | Age: 84
End: 2019-03-07

## 2019-03-07 VITALS
WEIGHT: 111.33 LBS | BODY MASS INDEX: 23.02 KG/M2 | OXYGEN SATURATION: 100 % | RESPIRATION RATE: 16 BRPM | SYSTOLIC BLOOD PRESSURE: 132 MMHG | DIASTOLIC BLOOD PRESSURE: 75 MMHG | TEMPERATURE: 98.4 F | HEART RATE: 60 BPM

## 2019-03-07 DIAGNOSIS — Z98.89 OTHER SPECIFIED POSTPROCEDURAL STATES: Chronic | ICD-10-CM

## 2019-03-07 DIAGNOSIS — R79.89 OTHER SPECIFIED ABNORMAL FINDINGS OF BLOOD CHEMISTRY: ICD-10-CM

## 2019-03-07 DIAGNOSIS — D64.9 ANEMIA, UNSPECIFIED: ICD-10-CM

## 2019-03-07 DIAGNOSIS — R53.83 OTHER FATIGUE: ICD-10-CM

## 2019-03-07 LAB
BASOPHILS # BLD AUTO: 0 K/UL — SIGNIFICANT CHANGE UP (ref 0–0.2)
BASOPHILS NFR BLD AUTO: 0.8 % — SIGNIFICANT CHANGE UP (ref 0–2)
BLD GP AB SCN SERPL QL: NEGATIVE — SIGNIFICANT CHANGE UP
EOSINOPHIL # BLD AUTO: 0 K/UL — SIGNIFICANT CHANGE UP (ref 0–0.5)
EOSINOPHIL NFR BLD AUTO: 0 % — SIGNIFICANT CHANGE UP (ref 0–6)
HCT VFR BLD CALC: 27 % — LOW (ref 34.5–45)
HGB BLD-MCNC: 9.1 G/DL — LOW (ref 11.5–15.5)
LYMPHOCYTES # BLD AUTO: 1 K/UL — SIGNIFICANT CHANGE UP (ref 1–3.3)
LYMPHOCYTES # BLD AUTO: 38.7 % — SIGNIFICANT CHANGE UP (ref 13–44)
MCHC RBC-ENTMCNC: 33.7 G/DL — SIGNIFICANT CHANGE UP (ref 32–36)
MCHC RBC-ENTMCNC: 33.7 PG — SIGNIFICANT CHANGE UP (ref 27–34)
MCV RBC AUTO: 99.9 FL — SIGNIFICANT CHANGE UP (ref 80–100)
MONOCYTES # BLD AUTO: 0.2 K/UL — SIGNIFICANT CHANGE UP (ref 0–0.9)
MONOCYTES NFR BLD AUTO: 9.7 % — SIGNIFICANT CHANGE UP (ref 2–14)
NEUTROPHILS # BLD AUTO: 1.2 K/UL — LOW (ref 1.8–7.4)
NEUTROPHILS NFR BLD AUTO: 50.8 % — SIGNIFICANT CHANGE UP (ref 43–77)
OB PNL STL: NEGATIVE — SIGNIFICANT CHANGE UP
PLATELET # BLD AUTO: 184 K/UL — SIGNIFICANT CHANGE UP (ref 150–400)
RBC # BLD: 2.71 M/UL — LOW (ref 3.8–5.2)
RBC # FLD: 14.3 % — SIGNIFICANT CHANGE UP (ref 10.3–14.5)
RETICS #: 36.7 K/UL — SIGNIFICANT CHANGE UP (ref 25–125)
RETICS/RBC NFR: 1.4 % — SIGNIFICANT CHANGE UP (ref 0.5–2.5)
RH IG SCN BLD-IMP: POSITIVE — SIGNIFICANT CHANGE UP
WBC # BLD: 2.5 K/UL — LOW (ref 3.8–10.5)
WBC # FLD AUTO: 2.5 K/UL — LOW (ref 3.8–10.5)

## 2019-03-07 PROCEDURE — 99215 OFFICE O/P EST HI 40 MIN: CPT

## 2019-03-07 NOTE — REVIEW OF SYSTEMS
[SOB on Exertion] : shortness of breath during exertion [Anxiety] : anxiety [Depression] : depression [Negative] : Allergic/Immunologic [Mucosal Pain] : no mucosal pain [Shortness Of Breath] : no shortness of breath [Insomnia] : no insomnia [FreeTextEntry2] : Fatigue overall slightly improved since last seen.  S/P flu vaccination 10/9/2018. [FreeTextEntry3] : Last eye exam with benign findings [FreeTextEntry4] : Had audiological evaluation 03/05/2019, exam stable. Patient wears bilateral earing aids.\par Had dental cleaning 01/09/2019. Saw podiatrist 01/24/2019 [FreeTextEntry5] : .See interval history. [FreeTextEntry6] : Increased THOMAS, gets SOB while making her bed [FreeTextEntry8] : Last GYN exam 04/14/2017, no bleeding. [FreeTextEntry9] : Unchanged low back pain. [de-identified] : Numbness in bilateral hand  and finger tips worse at HS. Forgetful at times. [de-identified] : 'I was born worrying".

## 2019-03-07 NOTE — PHYSICAL EXAM
[Restricted in physically strenuous activity but ambulatory and able to carry out work of a light or sedentary nature] : Status 1- Restricted in physically strenuous activity but ambulatory and able to carry out work of a light or sedentary nature, e.g., light house work, office work [Thin] : thin [Normal] : affect appropriate [de-identified] : Right breast: scars LOQ and Axilla, no breast mass. Left breast: no mass

## 2019-03-07 NOTE — HISTORY OF PRESENT ILLNESS
[Disease: _____________________] : Disease: [unfilled] [T: ___] : T[unfilled] [N: ___] : N[unfilled] [M: ___] : M[unfilled] [AJCC Stage: ____] : AJCC Stage: [unfilled] [Treatment Protocol] : Treatment Protocol [Therapy: ___] : Therapy: [unfilled] [de-identified] : The patient presented in 2002, at age 74, with an abnormal screening mammogram of the right breast.\par \par \par \par Ultrasound-guided core biopsy on July 18, 2002 demonstrated infiltrating lobular carcinoma which was ER positive (90%), AL positive (60%) and HER-2/yony positive (3+ by IHC).\par \par \par \par Dr. Mai Brown performed lumpectomy and sentinel lymph node biopsy on August 12, 2002. Pathology was positive for a 1.5 cm infiltrating lobular carcinoma with negative margins. The tumor was also ER positive (90%), AL positive (70%) and HER-2/yony 3+. 1/2 sentinel lymph nodes was positive for metastatic disease. The patient underwent completion axillary lymph node dissection in September 9, 2002 which demonstrated 26 negative nodes.\par \par \par \par The patient received 5 cycles of adjuvant AC chemotherapy extending from November 4, 2002 through January 27, 2002. She received a cumulative dose of 490 mg (300 mg/meter squared) of Adriamycin.\par \par \par \par The patient [place] [facility] [place] the supervision of Dr. Francia Mckeon.\par \par \par \par The patient subsequently took anastrozole from May 2003 through June 2008.\par \par \par \par The patient was found to have bone metastases on CT scan of her spine in January 2016. She initiated treatment with anastrozole and Xgeva in February 2016. Anastrozole was discontinued on 5/17/2017 because of progression in bone and fulvestrant was initiated on that date. [de-identified] : Infiltrating lobular carcinoma ER positive [place] positive HER-2/yony positive [FreeTextEntry1] : CAF x 5 11/4/2002 - 1/27/2003\par Anastrozole 5/2003 - 6/2008, Resumed anastrozole 2/22/2016 - 5/17/2017\par Xgeva start 2/26/2016\par Fulvestrant start 5/17/2017.\par Ibrance start 4/19/2018 [de-identified] : The patient is 16  years 8  months  post initial diagnosis of breast cancer  and  2 years 7  months  since diagnosis of bone metastases.\par \par Started  fulvestrant 1 year 9  months ago.\par \par Ibrance was added to her regimen 10 months ago.\par \par The patient  initiated current cycle Ibrance at 75mg  01/21/2019, today is D 17/21, missed 1 day, (while awaiting result of D14 CBC blood counts) and has 4 pills left. The patient is due to initiate new Ibrance cycle on 03/19/2019. \par \par Main complaint remains fatigue which has improved since last seen, states she is more tired this week. Daughter  feels dehydration may be contributing to her fatigue.\par \par Mild pain in  low back radiating to left leg has unchanged since last seen\par \par The patient has been on Xgeva x 2 years 9 months . The patient is scheduled for  Xgeva and Faslodex after this visit.  \par \par Most recent PET CT dated  9/08/2018,  stable or improved when compared with previous study of 3/17/2018.\par \par Had f/u with  cardiologist  Dr Tima Wagner,  03/01/2019, exam stable. The patient states  cardiac her  regimen remain unchanged. Patient  advised patient to  return  for f/u  in 3 months. On 1/11/2019 had carotid Doppler, report demonstrated no significant obstructive in the extracranial carotid system. \par \par Overall the patient state she feels well since last seen.\par \par No other interval events since last visit.

## 2019-03-08 PROCEDURE — 86923 COMPATIBILITY TEST ELECTRIC: CPT

## 2019-03-08 PROCEDURE — 86900 BLOOD TYPING SEROLOGIC ABO: CPT

## 2019-03-08 PROCEDURE — 86850 RBC ANTIBODY SCREEN: CPT

## 2019-03-08 PROCEDURE — 86901 BLOOD TYPING SEROLOGIC RH(D): CPT

## 2019-03-09 ENCOUNTER — APPOINTMENT (OUTPATIENT)
Dept: INFUSION THERAPY | Facility: HOSPITAL | Age: 84
End: 2019-03-09

## 2019-03-11 LAB
FOLATE SERPL-MCNC: >20 NG/ML
IRON SATN MFR SERPL: 21 %
IRON SERPL-MCNC: 65 UG/DL
TIBC SERPL-MCNC: 306 UG/DL
TSH SERPL-ACNC: 3.23 UIU/ML
UIBC SERPL-MCNC: 241 UG/DL
VIT B12 SERPL-MCNC: 604 PG/ML

## 2019-03-12 DIAGNOSIS — Z51.89 ENCOUNTER FOR OTHER SPECIFIED AFTERCARE: ICD-10-CM

## 2019-03-18 ENCOUNTER — LABORATORY RESULT (OUTPATIENT)
Age: 84
End: 2019-03-18

## 2019-03-20 ENCOUNTER — MESSAGE (OUTPATIENT)
Age: 84
End: 2019-03-20

## 2019-03-26 ENCOUNTER — RX RENEWAL (OUTPATIENT)
Age: 84
End: 2019-03-26

## 2019-03-26 ENCOUNTER — OUTPATIENT (OUTPATIENT)
Dept: OUTPATIENT SERVICES | Facility: HOSPITAL | Age: 84
LOS: 1 days | Discharge: HOME | End: 2019-03-26

## 2019-03-26 DIAGNOSIS — C50.911 MALIGNANT NEOPLASM OF UNSPECIFIED SITE OF RIGHT FEMALE BREAST: ICD-10-CM

## 2019-03-26 DIAGNOSIS — Z98.89 OTHER SPECIFIED POSTPROCEDURAL STATES: Chronic | ICD-10-CM

## 2019-03-26 DIAGNOSIS — C79.51 SECONDARY MALIGNANT NEOPLASM OF BONE: ICD-10-CM

## 2019-03-28 LAB
ALBUMIN SERPL ELPH-MCNC: 4.2 G/DL
ALP BLD-CCNC: 78 U/L
ALT SERPL-CCNC: 16 U/L
ANION GAP SERPL CALC-SCNC: 11 MMOL/L
AST SERPL-CCNC: 31 U/L
BASOPHILS # BLD AUTO: 0.07 K/UL
BASOPHILS NFR BLD AUTO: 2.2 %
BILIRUB SERPL-MCNC: 0.2 MG/DL
BUN SERPL-MCNC: 42 MG/DL
CALCIUM SERPL-MCNC: 9.1 MG/DL
CHLORIDE SERPL-SCNC: 105 MMOL/L
CO2 SERPL-SCNC: 25 MMOL/L
CREAT SERPL-MCNC: 1.2 MG/DL
EOSINOPHIL # BLD AUTO: 0 K/UL
EOSINOPHIL NFR BLD AUTO: 0 %
GLUCOSE SERPL-MCNC: 85 MG/DL
HCT VFR BLD CALC: 33.9 %
HGB BLD-MCNC: 10.4 G/DL
IMM GRANULOCYTES NFR BLD AUTO: 1.3 %
LYMPHOCYTES # BLD AUTO: 0.95 K/UL
LYMPHOCYTES NFR BLD AUTO: 30.2 %
MAN DIFF?: NORMAL
MCHC RBC-ENTMCNC: 30.7 GM/DL
MCHC RBC-ENTMCNC: 31.8 PG
MCV RBC AUTO: 103.7 FL
MONOCYTES # BLD AUTO: 0.58 K/UL
MONOCYTES NFR BLD AUTO: 18.4 %
NEUTROPHILS # BLD AUTO: 1.51 K/UL
NEUTROPHILS NFR BLD AUTO: 47.9 %
PLATELET # BLD AUTO: 206 K/UL
POTASSIUM SERPL-SCNC: 5.5 MMOL/L
PROT SERPL-MCNC: 6.3 G/DL
RBC # BLD: 3.27 M/UL
RBC # FLD: 15.4 %
SODIUM SERPL-SCNC: 141 MMOL/L
WBC # FLD AUTO: 3.15 K/UL

## 2019-04-01 ENCOUNTER — LABORATORY RESULT (OUTPATIENT)
Age: 84
End: 2019-04-01

## 2019-04-04 ENCOUNTER — APPOINTMENT (OUTPATIENT)
Dept: HEMATOLOGY ONCOLOGY | Facility: CLINIC | Age: 84
End: 2019-04-04
Payer: MEDICARE

## 2019-04-04 ENCOUNTER — APPOINTMENT (OUTPATIENT)
Dept: INFUSION THERAPY | Facility: HOSPITAL | Age: 84
End: 2019-04-04

## 2019-04-04 VITALS
HEART RATE: 77 BPM | TEMPERATURE: 99.2 F | OXYGEN SATURATION: 100 % | DIASTOLIC BLOOD PRESSURE: 73 MMHG | BODY MASS INDEX: 22.79 KG/M2 | WEIGHT: 110.23 LBS | RESPIRATION RATE: 16 BRPM | SYSTOLIC BLOOD PRESSURE: 160 MMHG

## 2019-04-04 DIAGNOSIS — Z87.09 PERSONAL HISTORY OF OTHER DISEASES OF THE RESPIRATORY SYSTEM: ICD-10-CM

## 2019-04-04 PROCEDURE — 99215 OFFICE O/P EST HI 40 MIN: CPT

## 2019-04-04 RX ORDER — GABAPENTIN 100 MG/1
100 CAPSULE ORAL
Refills: 0 | Status: DISCONTINUED | COMMUNITY
Start: 2018-01-09 | End: 2019-04-04

## 2019-04-04 NOTE — REVIEW OF SYSTEMS
[Mucosal Pain] : no mucosal pain [Shortness Of Breath] : no shortness of breath [Insomnia] : no insomnia [FreeTextEntry2] : Fatigue overall slightly improved since last seen.  S/P flu vaccination 10/9/2018. [FreeTextEntry3] : Last eye exam with benign findings [FreeTextEntry4] : Had audiological evaluation 03/05/2019, exam stable. Patient wears bilateral earing aids.\par Had dental cleaning 01/09/2019. Saw podiatrist 01/24/2019 [FreeTextEntry5] : .See interval history. [FreeTextEntry6] : Increased THOMAS, gets SOB while making her bed [FreeTextEntry8] : Last GYN exam 04/14/2017, no bleeding. [FreeTextEntry9] : Unchanged low back pain. [de-identified] : Numbness in bilateral hand  and finger tips worse at HS. Forgetful at times. [de-identified] : 'I was born worrying".

## 2019-04-04 NOTE — HISTORY OF PRESENT ILLNESS
[de-identified] : The patient presented in 2002, at age 74, with an abnormal screening mammogram of the right breast.\par \par \par \par Ultrasound-guided core biopsy on July 18, 2002 demonstrated infiltrating lobular carcinoma which was ER positive (90%), IL positive (60%) and HER-2/yony positive (3+ by IHC).\par \par \par \par Dr. Mai Brown performed lumpectomy and sentinel lymph node biopsy on August 12, 2002. Pathology was positive for a 1.5 cm infiltrating lobular carcinoma with negative margins. The tumor was also ER positive (90%), IL positive (70%) and HER-2/yony 3+. 1/2 sentinel lymph nodes was positive for metastatic disease. The patient underwent completion axillary lymph node dissection in September 9, 2002 which demonstrated 26 negative nodes.\par \par \par \par The patient received 5 cycles of adjuvant AC chemotherapy extending from November 4, 2002 through January 27, 2002. She received a cumulative dose of 490 mg (300 mg/meter squared) of Adriamycin.\par \par \par \par The [place] [facility] [place] the supervision of Dr. Francia Mckeon.\par \par \par \par The patient subsequently took anastrozole from May 2003 through June 2008.\par \par \par \par The patient was found to have bone metastases on CT scan of her spine in January 2016. She initiated treatment with anastrozole and Xgeva in February 2016. Anastrozole was discontinued on 5/17/2017 because of progression in bone and fulvestrant was initiated on that date. [de-identified] : Infiltrating lobular carcinoma ER positive [place] positive HER-2/yony positive [FreeTextEntry1] : CAF x 5 11/4/2002 - 1/27/2003\par Anastrozole 5/2003 - 6/2008, Resumed anastrozole 2/22/2016 - 5/17/2017\par Xgeva start 2/26/2016\par Fulvestrant start 5/17/2017.\par Ibrance start 4/19/2018 [de-identified] : The patient is 16  years 9  months  post initial diagnosis of breast cancer  and  2 years 8 months  since diagnosis of bone metastases.\par \par Started  fulvestrant 1 year 10  months ago.The patient is scheduled for Fulvestrant after this visit.\par \par Ca 27.29 on 4/1/2019 increased to 128.\par \par The patient initiated Ibrance since 4/19/2018 and Xgeva since 2/26/2016. On 03/29/2019, (D2/21 of current Ibrance cycle) patient daughter called to advised the office that the patient lost her tooth that morning. The patient was advised to discontinued  Ibrance and Xgeva immediately and f/u with her dentist.The patient states she saw the dentist on 4/1/2019. Filling and part of tooth fell out, dentist does not want to do anything else now.\par The patient  daughter states  the patient did not stop taking Ibrance as instructed by the NP. The patient daughter states she started current Ibrance  at 75mg, today is day 7 of 21.\par \par Developed rhinitis without fever last night.\par \par Main complaint remains fatigue which has improved since last seen, states she is more tired this week. Daughter  feels dehydration may be contributing to her fatigue.\par \par Mild pain in  low back radiating to left leg has unchanged since last seen.\par \par The patient states short term memory loss is now more pronounced  and requiring more supervision.\par \par Most recent PET CT dated  9/08/2018,  stable or improved when compared with previous study of 3/17/2018.\par \par Had f/u with  cardiologist  Dr Tima Wagner,  03/01/2019, exam stable. The patient states  cardiac her  regimen remain unchanged. Patient  advised  to  return  for f/u  in 3 months. On 1/11/2019 had carotid Doppler, report demonstrated no significant obstructive in the extracranial carotid system. \par \par Saw PCP Dr Baez 03/12/2018 for f/u elevated serum creatinine was (1.61 0n 4/1/2019). The patient states she has a flu with nephrologist Pedro Luis Donovan 4/09/2019\par \par Overall the patient state she feels well since last seen.\par \par No other interval events since last visit.

## 2019-04-07 ENCOUNTER — TRANSCRIPTION ENCOUNTER (OUTPATIENT)
Age: 84
End: 2019-04-07

## 2019-04-18 NOTE — HISTORY OF PRESENT ILLNESS
[de-identified] : The patient presented in 2002, at age 74, with an abnormal screening mammogram of the right breast.\par \par \par \par Ultrasound-guided core biopsy on July 18, 2002 demonstrated infiltrating lobular carcinoma which was ER positive (90%), MS positive (60%) and HER-2/yony positive (3+ by IHC).\par \par \par \par Dr. Mai Brown performed lumpectomy and sentinel lymph node biopsy on August 12, 2002. Pathology was positive for a 1.5 cm infiltrating lobular carcinoma with negative margins. The tumor was also ER positive (90%), MS positive (70%) and HER-2/yony 3+. 1/2 sentinel lymph nodes was positive for metastatic disease. The patient underwent completion axillary lymph node dissection in September 9, 2002 which demonstrated 26 negative nodes.\par \par \par \par The patient received 5 cycles of adjuvant AC chemotherapy extending from November 4, 2002 through January 27, 2002. She received a cumulative dose of 490 mg (300 mg/meter squared) of Adriamycin.\par \par \par \par The patient received radiation therapy [facility] in [place] under the supervision of Dr. Francia Mckeon.\par \par \par \par The patient subsequently took anastrozole from May 2003 through June 2008.\par \par \par \par The patient was found to have bone metastases on CT scan of her spine in January 2016. She initiated treatment with anastrozole and Xgeva in February 2016. Anastrozole was discontinued on 5/17/2017 because of progression in bone and fulvestrant was initiated on that date. [de-identified] : Infiltrating lobular carcinoma ER positive MS positive HER-2/yony positive [FreeTextEntry1] : CAF x 5 11/4/2002 - 1/27/2003\par Anastrozole 5/2003 - 6/2008, Resumed anastrozole 2/22/2016 - 5/17/2017\par Xgeva start 2/26/2016\par Fulvestrant start 5/17/2017.\par Ibrance start 4/19/2018 [de-identified] : The patient is 16  years 9  months  post initial diagnosis of breast cancer  and  2 years 7  months  since diagnosis of bone metastases.\par \par Started  fulvestrant 1 year 10  months ago.\par \par Ibrance was added to her regimen 11 months ago.\par \par The patient  initiated current cycle Ibrance at 75mg  01/18/2019, today is D 20/21, missed 1 day and has 1 pill left. The patient is due to initiate new Ibrance cycle on 02/16/2019. \par \par Main complaint remains fatigue which has improved since last seen, states she is more tired this week. \par \par Mild pain in  low back radiating to left leg has  improved since last seen\par \par The patient has been on Xgeva x 2 years 8  months . The patient had  Xgeva and Faslodex earlier  today. \par \par Most recent PET CT dated  9/08/2018,  stable or improved when compared with previous study of 3/17/2018.\par \par Last saw cardiologist Lolly Estrada 11/27/2018, had echo with EF 65%. \par \par Saw cardiologist  Bernard Crum 01/18/2019. Had carotid Doppler 01/11/2019, report demonstrated no significant obstructive in the extracranial carotid system. The patient states  amlodipine was added to her cardiac regimen and advised patient to  return  for f/u  03/01/2019.\par \par Overall the patient state she feels well since last seen.\par \par No other interval events since last visit.

## 2019-04-18 NOTE — REVIEW OF SYSTEMS
[Mucosal Pain] : no mucosal pain [Shortness Of Breath] : no shortness of breath [Insomnia] : no insomnia [FreeTextEntry2] : Fatigue overall slightly improved since last seen.  S/P flu vaccination 10/9/2018. [FreeTextEntry3] : Last eye exam with benign findings [FreeTextEntry4] : \par Had dental cleaning 01/09/2019. Saw podiatrist 01/24/2019 [FreeTextEntry5] : Saw cardiologist Lolly Estrada 11/27/2018, had echo with EF 65%. [FreeTextEntry6] : No resting dyspnea. Unchanged THOMAS going up stairs and  making her bed. [FreeTextEntry8] : Last GYN exam 04/14/2017, no bleeding. [FreeTextEntry9] : See interval history. [de-identified] : Numbness in bilateral hand  and finger tips. Forgetful at times. [de-identified] : 'I was born worrying".

## 2019-04-25 ENCOUNTER — LABORATORY RESULT (OUTPATIENT)
Age: 84
End: 2019-04-25

## 2019-04-25 ENCOUNTER — MESSAGE (OUTPATIENT)
Age: 84
End: 2019-04-25

## 2019-04-29 ENCOUNTER — OUTPATIENT (OUTPATIENT)
Dept: OUTPATIENT SERVICES | Facility: HOSPITAL | Age: 84
LOS: 1 days | Discharge: HOME | End: 2019-04-29

## 2019-04-29 DIAGNOSIS — C50.911 MALIGNANT NEOPLASM OF UNSPECIFIED SITE OF RIGHT FEMALE BREAST: ICD-10-CM

## 2019-04-29 DIAGNOSIS — Z98.89 OTHER SPECIFIED POSTPROCEDURAL STATES: Chronic | ICD-10-CM

## 2019-04-29 DIAGNOSIS — C79.51 SECONDARY MALIGNANT NEOPLASM OF BONE: ICD-10-CM

## 2019-05-01 ENCOUNTER — RESULT REVIEW (OUTPATIENT)
Age: 84
End: 2019-05-01

## 2019-05-02 ENCOUNTER — APPOINTMENT (OUTPATIENT)
Dept: INFUSION THERAPY | Facility: HOSPITAL | Age: 84
End: 2019-05-02

## 2019-05-02 ENCOUNTER — APPOINTMENT (OUTPATIENT)
Dept: HEMATOLOGY ONCOLOGY | Facility: CLINIC | Age: 84
End: 2019-05-02
Payer: MEDICARE

## 2019-05-02 VITALS
BODY MASS INDEX: 22.79 KG/M2 | DIASTOLIC BLOOD PRESSURE: 72 MMHG | RESPIRATION RATE: 16 BRPM | OXYGEN SATURATION: 97 % | HEART RATE: 65 BPM | WEIGHT: 110.23 LBS | SYSTOLIC BLOOD PRESSURE: 133 MMHG | TEMPERATURE: 98.5 F

## 2019-05-02 DIAGNOSIS — T45.1X5A NAUSEA: ICD-10-CM

## 2019-05-02 DIAGNOSIS — R11.0 NAUSEA: ICD-10-CM

## 2019-05-02 PROCEDURE — 99215 OFFICE O/P EST HI 40 MIN: CPT

## 2019-05-02 RX ORDER — PALBOCICLIB 75 MG/1
75 CAPSULE ORAL DAILY
Qty: 21 | Refills: 0 | Status: DISCONTINUED | COMMUNITY
Start: 2018-04-06 | End: 2019-05-02

## 2019-05-02 RX ORDER — ONDANSETRON 4 MG/1
4 TABLET ORAL
Qty: 30 | Refills: 6 | Status: ACTIVE | COMMUNITY
Start: 2019-05-02 | End: 1900-01-01

## 2019-05-02 NOTE — PHYSICAL EXAM
[Restricted in physically strenuous activity but ambulatory and able to carry out work of a light or sedentary nature] : Status 1- Restricted in physically strenuous activity but ambulatory and able to carry out work of a light or sedentary nature, e.g., light house work, office work [Thin] : thin [Normal] : affect appropriate [de-identified] : Right breast: scars LOQ and Axilla, no breast mass. Left breast: no mass [de-identified] : + straight leg raising sign in distribution of lateral femoral cutaneous nerve at 45 degrees left leg. Vertebral column nontender.

## 2019-05-02 NOTE — HISTORY OF PRESENT ILLNESS
[Disease: _____________________] : Disease: [unfilled] [N: ___] : N[unfilled] [T: ___] : T[unfilled] [M: ___] : M[unfilled] [AJCC Stage: ____] : AJCC Stage: [unfilled] [Treatment Protocol] : Treatment Protocol [Therapy: ___] : Therapy: [unfilled] [de-identified] : The patient presented in 2002, at age 74, with an abnormal screening mammogram of the right breast.\par \par \par \par Ultrasound-guided core biopsy on July 18, 2002 demonstrated infiltrating lobular carcinoma which was ER positive (90%), MS positive (60%) and HER-2/yony positive (3+ by IHC).\par \par \par \par Dr. Mai Brown performed lumpectomy and sentinel lymph node biopsy on August 12, 2002. Pathology was positive for a 1.5 cm infiltrating lobular carcinoma with negative margins. The tumor was also ER positive (90%), MS positive (70%) and HER-2/yony 3+. 1/2 sentinel lymph nodes was positive for metastatic disease. The patient underwent completion axillary lymph node dissection in September 9, 2002 which demonstrated 26 negative nodes.\par \par \par \par The patient received 5 cycles of adjuvant AC chemotherapy extending from November 4, 2002 through January 27, 2002. She received a cumulative dose of 490 mg (300 mg/meter squared) of Adriamycin.\par \par \par \par The patient received radiation therapy [facility] in [place] under the supervision of Dr. Francia Mckeon.\par \par \par \par The patient subsequently took anastrozole from May 2003 through June 2008.\par \par \par \par The patient was found to have bone metastases on CT scan of her spine in January 2016. She initiated treatment with anastrozole and Xgeva in February 2016. Anastrozole was discontinued on 5/17/2017 because of progression in bone and fulvestrant was initiated on that date. [de-identified] : Infiltrating lobular carcinoma ER positive [place] positive HER-2/yony positive [FreeTextEntry1] : CAF x 5 11/4/2002 - 1/27/2003\par Anastrozole 5/2003 - 6/2008, Resumed anastrozole 2/22/2016 - 5/17/2017\par Xgeva start 2/26/2016 - 2019, fractured tooth possibly needing extraction\par Fulvestrant start 5/17/2017 - 4/4/2019\par Ibrance start 4/19/2018 - 5/202019\par Xeloda start [de-identified] : The patient is 16  years 10  months  post initial diagnosis of breast cancer  and  2 years 9 months  since diagnosis of bone metastases.\par \par Started  fulvestrant 1 year 10  months ago.\par \par Ca 27.29 on 4/25/2019 increased to 151.3 from 128 on 4/1/2019..\par \par The patient initiated Ibrance since 4/19/2018 and Xgeva since 2/26/2016. On 03/29/2019, (D2/21 of current Ibrance cycle) patient daughter called to advised the office that the patient lost her tooth that morning. The patient was advised to discontinued  Ibrance and Xgeva immediately and f/u with her dentist.The patient states she saw the dentist on 4/1/2019. Filling and part of tooth fell out, dentist does not want to do anything else now.\par \par The patient  daughter states  the patient did not stop taking Ibrance as instructed by the NP. The patient daughter states she started current Ibrance  at 75mg,  03/28/2019, had 12/21 till 04/08/2019 and the cycle was interrupted due to influenza. The patient resumed Ibrance 04/25/2019. The patient daughter sates patient had 4 Ibrance left as of today\par \par Main complaint remains fatigue which has improved since last seen, states she is more tired this week. Daughter  feels dehydration may be contributing to her fatigue.Has increased pain radiating down LLE today.\par \par Most recent PET CT dated  4/22/2019 demonstrated progression of osseous metastases.\par \par The patient had the flu on 4/8/2019 after her last visit here .Was seen in ER at [facility] for hydration 4/15/2019.\par \par No other interval events since last visit.

## 2019-05-02 NOTE — REVIEW OF SYSTEMS
[SOB on Exertion] : shortness of breath during exertion [Depression] : depression [Anxiety] : anxiety [Negative] : Allergic/Immunologic [Mucosal Pain] : no mucosal pain [Shortness Of Breath] : no shortness of breath [Insomnia] : no insomnia [FreeTextEntry2] : Fatigue overall slightly improved since last seen.  S/P flu vaccination 10/9/2018. [FreeTextEntry3] : Last eye exam with benign findings [FreeTextEntry4] : Had audiological evaluation 03/05/2019, exam stable. Patient wears bilateral earing aids.\par Had dental cleaning 01/09/2019. Saw podiatrist 01/24/2019 [FreeTextEntry5] : .See interval history. [FreeTextEntry6] : Increased THOMAS, gets SOB while making her bed [FreeTextEntry8] : Last GYN exam 04/14/2017, no bleeding. [FreeTextEntry9] : Unchanged low back pain. [de-identified] : Numbness in bilateral hand  and finger tips worse at HS. Forgetful at times. [de-identified] : 'I was born worrying".

## 2019-05-22 ENCOUNTER — MESSAGE (OUTPATIENT)
Age: 84
End: 2019-05-22

## 2019-05-24 ENCOUNTER — LABORATORY RESULT (OUTPATIENT)
Age: 84
End: 2019-05-24

## 2019-05-25 ENCOUNTER — OUTPATIENT (OUTPATIENT)
Dept: OUTPATIENT SERVICES | Facility: HOSPITAL | Age: 84
LOS: 1 days | Discharge: HOME | End: 2019-05-25

## 2019-05-25 DIAGNOSIS — Z98.89 OTHER SPECIFIED POSTPROCEDURAL STATES: Chronic | ICD-10-CM

## 2019-05-25 DIAGNOSIS — C50.911 MALIGNANT NEOPLASM OF UNSPECIFIED SITE OF RIGHT FEMALE BREAST: ICD-10-CM

## 2019-05-30 ENCOUNTER — RESULT REVIEW (OUTPATIENT)
Age: 84
End: 2019-05-30

## 2019-05-30 ENCOUNTER — OTHER (OUTPATIENT)
Age: 84
End: 2019-05-30

## 2019-05-30 ENCOUNTER — APPOINTMENT (OUTPATIENT)
Dept: INFUSION THERAPY | Facility: HOSPITAL | Age: 84
End: 2019-05-30

## 2019-05-30 ENCOUNTER — APPOINTMENT (OUTPATIENT)
Dept: HEMATOLOGY ONCOLOGY | Facility: CLINIC | Age: 84
End: 2019-05-30
Payer: MEDICARE

## 2019-05-30 ENCOUNTER — RX RENEWAL (OUTPATIENT)
Age: 84
End: 2019-05-30

## 2019-05-30 ENCOUNTER — OUTPATIENT (OUTPATIENT)
Dept: OUTPATIENT SERVICES | Facility: HOSPITAL | Age: 84
LOS: 1 days | End: 2019-05-30
Payer: MEDICARE

## 2019-05-30 VITALS
OXYGEN SATURATION: 98 % | DIASTOLIC BLOOD PRESSURE: 69 MMHG | RESPIRATION RATE: 16 BRPM | HEART RATE: 66 BPM | SYSTOLIC BLOOD PRESSURE: 124 MMHG | BODY MASS INDEX: 22.79 KG/M2 | WEIGHT: 110.23 LBS

## 2019-05-30 DIAGNOSIS — Z98.89 OTHER SPECIFIED POSTPROCEDURAL STATES: Chronic | ICD-10-CM

## 2019-05-30 DIAGNOSIS — D64.9 ANEMIA, UNSPECIFIED: ICD-10-CM

## 2019-05-30 LAB
ACANTHOCYTES BLD QL SMEAR: SLIGHT — SIGNIFICANT CHANGE UP
ANISOCYTOSIS BLD QL: SLIGHT — SIGNIFICANT CHANGE UP
BASO STIPL BLD QL SMEAR: PRESENT — SIGNIFICANT CHANGE UP
BASOPHILS # BLD AUTO: 0 K/UL — SIGNIFICANT CHANGE UP (ref 0–0.2)
BLD GP AB SCN SERPL QL: NEGATIVE — SIGNIFICANT CHANGE UP
DACRYOCYTES BLD QL SMEAR: SLIGHT — SIGNIFICANT CHANGE UP
ELLIPTOCYTES BLD QL SMEAR: SIGNIFICANT CHANGE UP
EOSINOPHIL # BLD AUTO: 0 K/UL — SIGNIFICANT CHANGE UP (ref 0–0.5)
HCT VFR BLD CALC: 24.8 % — LOW (ref 34.5–45)
HGB BLD-MCNC: 8.4 G/DL — LOW (ref 11.5–15.5)
LYMPHOCYTES # BLD AUTO: 1.1 K/UL — SIGNIFICANT CHANGE UP (ref 1–3.3)
LYMPHOCYTES # BLD AUTO: 28 % — SIGNIFICANT CHANGE UP (ref 13–44)
MACROCYTES BLD QL: SLIGHT — SIGNIFICANT CHANGE UP
MCHC RBC-ENTMCNC: 32.8 PG — SIGNIFICANT CHANGE UP (ref 27–34)
MCHC RBC-ENTMCNC: 33.8 G/DL — SIGNIFICANT CHANGE UP (ref 32–36)
MCV RBC AUTO: 96.8 FL — SIGNIFICANT CHANGE UP (ref 80–100)
MICROCYTES BLD QL: SLIGHT — SIGNIFICANT CHANGE UP
MONOCYTES # BLD AUTO: 0.5 K/UL — SIGNIFICANT CHANGE UP (ref 0–0.9)
MONOCYTES NFR BLD AUTO: 8 % — SIGNIFICANT CHANGE UP (ref 2–14)
NEUTROPHILS # BLD AUTO: 2.6 K/UL — SIGNIFICANT CHANGE UP (ref 1.8–7.4)
NEUTROPHILS NFR BLD AUTO: 63 % — SIGNIFICANT CHANGE UP (ref 43–77)
NEUTS BAND # BLD: 1 % — SIGNIFICANT CHANGE UP (ref 0–8)
OVALOCYTES BLD QL SMEAR: SLIGHT — SIGNIFICANT CHANGE UP
PLAT MORPH BLD: NORMAL — SIGNIFICANT CHANGE UP
PLATELET # BLD AUTO: 200 K/UL — SIGNIFICANT CHANGE UP (ref 150–400)
POIKILOCYTOSIS BLD QL AUTO: SLIGHT — SIGNIFICANT CHANGE UP
POLYCHROMASIA BLD QL SMEAR: SLIGHT — SIGNIFICANT CHANGE UP
RBC # BLD: 2.56 M/UL — LOW (ref 3.8–5.2)
RBC # FLD: 15.8 % — HIGH (ref 10.3–14.5)
RBC BLD AUTO: ABNORMAL
RH IG SCN BLD-IMP: POSITIVE — SIGNIFICANT CHANGE UP
WBC # BLD: 4.3 K/UL — SIGNIFICANT CHANGE UP (ref 3.8–10.5)
WBC # FLD AUTO: 4.3 K/UL — SIGNIFICANT CHANGE UP (ref 3.8–10.5)

## 2019-05-30 PROCEDURE — 99214 OFFICE O/P EST MOD 30 MIN: CPT

## 2019-05-30 PROCEDURE — 99215 OFFICE O/P EST HI 40 MIN: CPT

## 2019-05-31 ENCOUNTER — APPOINTMENT (OUTPATIENT)
Dept: INFUSION THERAPY | Facility: HOSPITAL | Age: 84
End: 2019-05-31

## 2019-06-01 NOTE — HISTORY OF PRESENT ILLNESS
[T: ___] : T[unfilled] [Disease: _____________________] : Disease: [unfilled] [N: ___] : N[unfilled] [M: ___] : M[unfilled] [AJCC Stage: ____] : AJCC Stage: [unfilled] [de-identified] : The patient presented in 2002, at age 74, with an abnormal screening mammogram of the right breast. Ultrasound-guided core biopsy on July 18, 2002 demonstrated infiltrating lobular carcinoma which was ER positive (90%), HI positive (60%) and HER-2/yony positive (3+ by IHC)\par \par  Dr. Mai Brown performed lumpectomy and sentinel lymph node biopsy on August 12, 2002. Pathology was positive for a 1.5 cm infiltrating lobular carcinoma with negative margins. The tumor was also ER positive (90%), HI positive (70%) and HER-2/yony 3+. 1/2 sentinel lymph nodes was positive for metastatic disease. The patient underwent completion axillary lymph node dissection in September 9, 2002 which demonstrated 26 negative nodes. The patient received 5 cycles of adjuvant AC chemotherapy extending from November 4, 2002 through January 27, 2002. She received a cumulative dose of 490 mg (300 mg/meter squared) of Adriamycin.\par  \par \par The patient received radiation therapy [facility] in [place] under the supervision of Dr. Francia Mckeon. She subsequently took anastrozole from May 2003 through June 2008.\par 1/12/2016 Admitted to [facility] on 01/21/2016 for elevated BP, dizziness and discharged home 01/25/2016. Brain MRI 01/23/2016 during this hospitalization demonstrated abnormal enhancement of the cervical spine, new since 11/2014.  CT cervical spine 01/24/2016 demonstrated increased density in the left side of the T2 vertebral body of an indeterminate etiology. The patient"s PCP Dr David Booker, sent the patient for a PET / CT 02/04/2016 which demonstrated diffuse hypermetabolic sclerotic bone lesions felt to represent metastatic disease.  CT guided biopsy of T11 on 2/16/2016 was positive for metastatic carcinoma consistent with breast primary, ER positive > 90% and HI positive > 90 %, HER2 Negative.\par MRI C spine was obtained as she began to complain of cervical pain. The MRI was reviewed with neuroradiology here and it was felt that her are metastatic lesions were more likely causing her progressive pain than her degenerative changes. She began to have neck pain around February which was initially controlled with Tylenol, then codeine 15 mg 1-2 tabs. prn. She presented to [facility] room on 4/20 with uncontrolled neck pain and was given Percocet which caused nausea and vomiting.  She tolerated  required up to 45 mg of codeine with transient relief, the addition of Dexamethasone provided better pain control. \par \par 4/25/17 PET CT Diffuse mixed lytic and sclerotic lesions throughout osseous structures, most are not avid and represent treated foci. New hypermetabolism involving the inferior endplate of T11, additional hypermetabolism right iliac bone without definite corresponding CT abnormality. \par \par \par 5/5/16 to 5/11/16 C- spine XRT DR Marshall 5/5 fractions Medical marijuana given for pain and appetite\par radiation induced  mucositis with significant dysphagia, poor po intake  controlled with codeine, Ultram, gabapentin 100 mg, fentanyl patch 12 mcg to CW every 3 days for approximately 1 week with discontinuation as pain improved. \par 5/11/17 MRI Pelvis extensive thrombus sclerotic metastatic lesions diffusely throughout the visualized pelvis, lower vertebrae and proximal femur which correspond to extensive mixed sclerotic and lytic lesions on PET CT. Along the right iliac bone adjacent to the right SI joint there is a region of less sclerotic marrow replacement along the right sacral ala and left iliac bone. No cortical destruction and no pathologic fracture. MIld bilateral SI joint arthrosis. Moderate degenerative changes pubic symphysis. mild bilateral femoral acetabular arthrosis.  Limited evaluation of lumbar spine demonstrates lumbar spine degenerative disc disease and facet arthrosis. \par \par 5/17/17 Anastrazole was discontinued and Fulvestrant was started due to progression of disease in the bone. She initially had worsening right hip pain which improved after initiation of anastrazole. She has had depressed affect and disinterest in food\par 9/13/17 Seen for concern over mood and worry over lack of weight gain.\par 11/14/17 Left hip pain since 10/31. Xray with PMD requested to R/O acute change (ie fracture) Xray + for osseous mets, no fracture. 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   Poor tolerance to opioids (tramadol discontinued when patient noted elevation in BP to 140 systolic).  She has tolerated codeine. Given liquid codeine/tylenol and gabapentin due to dysphagia) .  Mobic discontinued. Did not fill hydromorphone. \par \par  [de-identified] : Infiltrating lobular carcinoma ER positive GA positive HER-2/yony positive [de-identified] : 1 year hiatus.Ess.no change in pain pattern.Takes 2 hydrocodone per day on most days.Issue of epidurals /efficacy re-discussed.

## 2019-06-01 NOTE — RESULTS/DATA
[FreeTextEntry1] : 11/21/17 MRI LSS complete report scanned into [employer]\par Impression: Multilevel degenerative spondylosis of the lumbar spine with varying degree of spinal canal, lareal recess and foraminal narrowing. \par In addition:\par L3-4 also with narrowing with mild indentation upon the descending both L4 nerve roots\par L4-5 anterolisthesis with uncovering disc material; superimposed diffuse disc bulge, bilateral facet arthropathy and ligamentum flavum thickenintg; right lateral recess narrowing with mass effect on descending right L5 nerve root. There is mild left and moderated right foraminal narrowing with mild mass effect on the exiting right L4 nerve root. \par

## 2019-06-01 NOTE — PHYSICAL EXAM
[Ambulatory and capable of all self care but unable to carry out any work activities] : Status 2- Ambulatory and capable of all self care but unable to carry out any work activities. Up and about more than 50% of waking hours [Normal] : affect appropriate [de-identified] : grossly on focal [de-identified] : ROM left hip improved

## 2019-06-01 NOTE — ASSESSMENT
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 [Palliative] : Goals of care discussed with patient: Palliative [Palliative Care Plan] : Patient was apprised of incurable nature of disease.  Hospice and Palliative care options discussed.

## 2019-06-01 NOTE — REVIEW OF SYSTEMS
[Fatigue] : fatigue [Recent Change In Weight] : ~T recent weight change [Dysphagia] : dysphagia [Hoarseness] : hoarseness [Odynophagia] : odynophagia [Vomiting] : vomiting [SOB on Exertion] : shortness of breath during exertion [Constipation] : constipation [Joint Pain] : joint pain [Joint Stiffness] : joint stiffness [Insomnia] : no insomnia [FreeTextEntry4] : hoarseness  and dysphagia is improved [Negative] : Allergic/Immunologic [FreeTextEntry8] : Last GYN exam 10/2015, exam benign [FreeTextEntry7] : BM every 3 days; hx GERD, Barretts esophagus long standing tx over this week; colonoscopy 2013,  told she does not need to do it again by gastroenterologist, . [FreeTextEntry9] : left hip; back [de-identified] : trigger finger digit 3 right hand since resuming anastrozole, hard to use right middle finger

## 2019-06-04 DIAGNOSIS — Z51.89 ENCOUNTER FOR OTHER SPECIFIED AFTERCARE: ICD-10-CM

## 2019-06-22 ENCOUNTER — OUTPATIENT (OUTPATIENT)
Dept: OUTPATIENT SERVICES | Facility: HOSPITAL | Age: 84
LOS: 1 days | Discharge: HOME | End: 2019-06-22

## 2019-06-22 DIAGNOSIS — C50.911 MALIGNANT NEOPLASM OF UNSPECIFIED SITE OF RIGHT FEMALE BREAST: ICD-10-CM

## 2019-06-22 DIAGNOSIS — Z98.89 OTHER SPECIFIED POSTPROCEDURAL STATES: Chronic | ICD-10-CM

## 2019-06-22 DIAGNOSIS — C79.51 SECONDARY MALIGNANT NEOPLASM OF BONE: ICD-10-CM

## 2019-06-24 ENCOUNTER — LABORATORY RESULT (OUTPATIENT)
Age: 84
End: 2019-06-24

## 2019-06-27 ENCOUNTER — APPOINTMENT (OUTPATIENT)
Dept: HEMATOLOGY ONCOLOGY | Facility: CLINIC | Age: 84
End: 2019-06-27
Payer: MEDICARE

## 2019-06-27 ENCOUNTER — RESULT REVIEW (OUTPATIENT)
Age: 84
End: 2019-06-27

## 2019-06-27 ENCOUNTER — APPOINTMENT (OUTPATIENT)
Dept: INFUSION THERAPY | Facility: HOSPITAL | Age: 84
End: 2019-06-27

## 2019-06-27 VITALS
TEMPERATURE: 98.3 F | SYSTOLIC BLOOD PRESSURE: 150 MMHG | RESPIRATION RATE: 16 BRPM | BODY MASS INDEX: 22.79 KG/M2 | WEIGHT: 110.23 LBS | DIASTOLIC BLOOD PRESSURE: 73 MMHG | OXYGEN SATURATION: 98 % | HEART RATE: 60 BPM

## 2019-06-27 LAB
BASOPHILS # BLD AUTO: 0 K/UL — SIGNIFICANT CHANGE UP (ref 0–0.2)
BASOPHILS NFR BLD AUTO: 0.2 % — SIGNIFICANT CHANGE UP (ref 0–2)
BLD GP AB SCN SERPL QL: NEGATIVE — SIGNIFICANT CHANGE UP
EOSINOPHIL # BLD AUTO: 0 K/UL — SIGNIFICANT CHANGE UP (ref 0–0.5)
EOSINOPHIL NFR BLD AUTO: 0.6 % — SIGNIFICANT CHANGE UP (ref 0–6)
HCT VFR BLD CALC: 27.9 % — LOW (ref 34.5–45)
HGB BLD-MCNC: 9.4 G/DL — LOW (ref 11.5–15.5)
LYMPHOCYTES # BLD AUTO: 1.2 K/UL — SIGNIFICANT CHANGE UP (ref 1–3.3)
LYMPHOCYTES # BLD AUTO: 35.8 % — SIGNIFICANT CHANGE UP (ref 13–44)
MCHC RBC-ENTMCNC: 32.9 PG — SIGNIFICANT CHANGE UP (ref 27–34)
MCHC RBC-ENTMCNC: 33.7 G/DL — SIGNIFICANT CHANGE UP (ref 32–36)
MCV RBC AUTO: 97.7 FL — SIGNIFICANT CHANGE UP (ref 80–100)
MONOCYTES # BLD AUTO: 0.4 K/UL — SIGNIFICANT CHANGE UP (ref 0–0.9)
MONOCYTES NFR BLD AUTO: 12.6 % — SIGNIFICANT CHANGE UP (ref 2–14)
NEUTROPHILS # BLD AUTO: 1.7 K/UL — LOW (ref 1.8–7.4)
NEUTROPHILS NFR BLD AUTO: 50.8 % — SIGNIFICANT CHANGE UP (ref 43–77)
PLATELET # BLD AUTO: 180 K/UL — SIGNIFICANT CHANGE UP (ref 150–400)
RBC # BLD: 2.86 M/UL — LOW (ref 3.8–5.2)
RBC # FLD: 15.9 % — HIGH (ref 10.3–14.5)
RH IG SCN BLD-IMP: POSITIVE — SIGNIFICANT CHANGE UP
WBC # BLD: 3.4 K/UL — LOW (ref 3.8–10.5)
WBC # FLD AUTO: 3.4 K/UL — LOW (ref 3.8–10.5)

## 2019-06-27 PROCEDURE — 99215 OFFICE O/P EST HI 40 MIN: CPT

## 2019-06-27 RX ORDER — LISINOPRIL 5 MG/1
5 TABLET ORAL
Qty: 90 | Refills: 0 | Status: DISCONTINUED | COMMUNITY
Start: 2018-06-26 | End: 2019-06-27

## 2019-06-27 RX ORDER — AMLODIPINE BESYLATE 2.5 MG/1
2.5 TABLET ORAL
Refills: 0 | Status: ACTIVE | COMMUNITY

## 2019-06-27 NOTE — HISTORY OF PRESENT ILLNESS
[N: ___] : N[unfilled] [Disease: _____________________] : Disease: [unfilled] [T: ___] : T[unfilled] [AJCC Stage: ____] : AJCC Stage: [unfilled] [M: ___] : M[unfilled] [Treatment Protocol] : Treatment Protocol [Therapy: ___] : Therapy: [unfilled] [de-identified] : The patient presented in 2002, at age 74, with an abnormal screening mammogram of the right breast.\par \par \par \par Ultrasound-guided core biopsy on July 18, 2002 demonstrated infiltrating lobular carcinoma which was ER positive (90%), GA positive (60%) and HER-2/yony positive (3+ by IHC).\par \par \par \par Dr. Mai Brown performed lumpectomy and sentinel lymph node biopsy on August 12, 2002. Pathology was positive for a 1.5 cm infiltrating lobular carcinoma with negative margins. The tumor was also ER positive (90%), GA positive (70%) and HER-2/yony 3+. 1/2 sentinel lymph nodes was positive for metastatic disease. The patient underwent completion axillary lymph node dissection in September 9, 2002 which demonstrated 26 negative nodes.\par \par \par \par The patient received 5 cycles of adjuvant AC chemotherapy extending from November 4, 2002 through January 27, 2002. She received a cumulative dose of 490 mg (300 mg/meter squared) of Adriamycin.\par \par \par \par The patient received radiation therapy [facility] in [place] under the supervision of Dr. Francia Mckeon.\par \par \par \par The patient subsequently took anastrozole from May 2003 through June 2008.\par \par \par \par The patient was found to have bone metastases on CT scan of her spine in January 2016. She initiated treatment with anastrozole and Xgeva in February 2016. Anastrozole was discontinued on 5/17/2017 because of progression in bone and fulvestrant was initiated on that date. [de-identified] : Infiltrating lobular carcinoma ER positive [place] positive HER-2/yony positive [de-identified] : The patient is 17  years  post initial diagnosis of breast cancer  and  2 years 11  months  since diagnosis of bone metastases.\par \par The patient has been on Xeloda 500 mg BID for first cycle, 1000 mg BID for cycle 2. 1 week on 1 week off. Completed current  cycle yesterday. No adverse effects. completed second cycle yesterday 6/26/2019.\par \par Most recent Ca 27.29 on 4/25/2019 was 151.3.\par \par Had consultation with oral surgeon Dr Erick Crawford 05/23/2019 re broken tooth.The surgeon recommended the broken tooth to be removed and the patient have an implant.. i spoke with him yesterday and advised him she is heavily pretreated with Xgeva and I did not agree with plan.  He subsequently spoke with her dentist who is going to observe her tooth and do root canal PRN.\par \par Most recent PET CT dated  4/22/2019 demonstrated progression of osseous metastases.\par \par Patient has MRI Lumbar  Spine 5/10/2019, stable.Ws seen by orthopedics who felt they had nothing else to offer.. Has appointment with neurosurgeon on 7/12/201,Dr Michael Louis.\par \par Has follow up with Dr Bravo on 8/22/2019.\par Main complaint remains fatigue which has improved since last seen, states she is more tired this week. On 06/24/2018  H/H  8.4/27 H/H was  7.9/25.4. 5/22/2019, and the patient was transfused with 1 packed cells on 5/51/2019.\par \par Unchanged pain in lower back radiation down her lower extremities, takes hydrocodone prn.Most recent visit to Dr Hinojosa for  pain management  05/30/2019.\par \par The patient states her Home care services, PT, visiting nurse and social work services  ended 05/28/2019.\par The patient states she has community medicaid and will be evaluated by medicaid choice tomorrow to access her need for home care services with possibility of getting a HHA\par \par Saw Dr Chen 06/18/2019  from [facility] for f/u pain management. He recommended epidural  injection.The patient and daughter were not happy with the his decision. The patient plan to see  a new [facility]geon  [facility].\par \par Saw her cardiologist Dr Carlos Wagner 06/21/2019 for  f/u, exam stable. Next f/u 08/06/2019.\par \par Saw  renal specialist  Dr Arita More yesterday, upon collaboration with her cardiologist,lisinopril was discontinued and the patient initiated on Norvasc 2.5mg QD.\par  \par Reports increasing  loss of short term memory. Daughter call her to remind her to take Xeloda.\par \par No other interval events since last visit. [FreeTextEntry1] : CAF x 5 11/4/2002 - 1/27/2003\par Anastrozole 5/2003 - 6/2008, Resumed anastrozole 2/22/2016 - 5/17/2017\par Xgeva start 2/26/2016 - 2019, fractured tooth possibly needing extraction\par Fulvestrant start 5/17/2017 - 4/4/2019\par Ibrance start 4/19/2018 - 5/202019\par Xeloda start 5/9/2019.

## 2019-06-27 NOTE — PHYSICAL EXAM
[Restricted in physically strenuous activity but ambulatory and able to carry out work of a light or sedentary nature] : Status 1- Restricted in physically strenuous activity but ambulatory and able to carry out work of a light or sedentary nature, e.g., light house work, office work [Thin] : thin [Normal] : affect appropriate [de-identified] : Right breast: scars LOQ and Axilla, no breast mass. Left breast: no mass [de-identified] : + straight leg raising sign in distribution of lateral femoral cutaneous nerve at 45 degrees left leg. Vertebral column nontender.

## 2019-06-27 NOTE — REVIEW OF SYSTEMS
[SOB on Exertion] : shortness of breath during exertion [Depression] : depression [Anxiety] : anxiety [Negative] : Respiratory [Shortness Of Breath] : no shortness of breath [Mucosal Pain] : no mucosal pain [FreeTextEntry2] : Increased fatigue..  S/P flu vaccination 10/9/2018. [Insomnia] : no insomnia [FreeTextEntry4] : Had audiological evaluation 03/05/2019, exam stable. Patient wears bilateral earing aids.\par Had dental cleaning 01/09/2019. Saw podiatrist 01/24/2019 [FreeTextEntry3] : Last eye exam with benign findings [FreeTextEntry8] : Last GYN exam 04/14/2017, no bleeding. [FreeTextEntry9] : Unchanged low back pain. [de-identified] : Numbness in bilateral hand  and finger tips worse at HS. Forgetful at times. [de-identified] : 'I was born worrying".

## 2019-06-28 PROCEDURE — 86923 COMPATIBILITY TEST ELECTRIC: CPT

## 2019-06-28 PROCEDURE — 86901 BLOOD TYPING SEROLOGIC RH(D): CPT

## 2019-06-28 PROCEDURE — 86900 BLOOD TYPING SEROLOGIC ABO: CPT

## 2019-06-28 PROCEDURE — 86850 RBC ANTIBODY SCREEN: CPT

## 2019-06-29 ENCOUNTER — APPOINTMENT (OUTPATIENT)
Dept: INFUSION THERAPY | Facility: HOSPITAL | Age: 84
End: 2019-06-29

## 2019-07-02 DIAGNOSIS — Z51.89 ENCOUNTER FOR OTHER SPECIFIED AFTERCARE: ICD-10-CM

## 2019-07-20 ENCOUNTER — OUTPATIENT (OUTPATIENT)
Dept: OUTPATIENT SERVICES | Facility: HOSPITAL | Age: 84
LOS: 1 days | Discharge: HOME | End: 2019-07-20

## 2019-07-20 DIAGNOSIS — C50.911 MALIGNANT NEOPLASM OF UNSPECIFIED SITE OF RIGHT FEMALE BREAST: ICD-10-CM

## 2019-07-20 DIAGNOSIS — C79.51 SECONDARY MALIGNANT NEOPLASM OF BONE: ICD-10-CM

## 2019-07-20 DIAGNOSIS — Z98.89 OTHER SPECIFIED POSTPROCEDURAL STATES: Chronic | ICD-10-CM

## 2019-07-22 ENCOUNTER — LABORATORY RESULT (OUTPATIENT)
Age: 84
End: 2019-07-22

## 2019-07-25 ENCOUNTER — RX RENEWAL (OUTPATIENT)
Age: 84
End: 2019-07-25

## 2019-07-25 ENCOUNTER — APPOINTMENT (OUTPATIENT)
Dept: HEMATOLOGY ONCOLOGY | Facility: CLINIC | Age: 84
End: 2019-07-25
Payer: MEDICARE

## 2019-07-25 VITALS
SYSTOLIC BLOOD PRESSURE: 162 MMHG | WEIGHT: 111.11 LBS | HEART RATE: 64 BPM | BODY MASS INDEX: 22.98 KG/M2 | TEMPERATURE: 98 F | OXYGEN SATURATION: 99 % | DIASTOLIC BLOOD PRESSURE: 69 MMHG | RESPIRATION RATE: 16 BRPM

## 2019-07-25 PROCEDURE — 99214 OFFICE O/P EST MOD 30 MIN: CPT

## 2019-07-25 RX ORDER — IRON POLYSACCHARIDE COMPLEX 150 MG
CAPSULE ORAL
Refills: 0 | Status: DISCONTINUED | COMMUNITY
End: 2019-07-25

## 2019-07-26 NOTE — REVIEW OF SYSTEMS
[SOB on Exertion] : shortness of breath during exertion [Anxiety] : anxiety [Depression] : depression [Negative] : Allergic/Immunologic [Mucosal Pain] : no mucosal pain [Shortness Of Breath] : no shortness of breath [FreeTextEntry2] : Unchanged  fatigue, feels her hip pain is a major contributory factor..  S/P flu vaccination 10/9/2018. [Insomnia] : no insomnia [FreeTextEntry3] : Last eye exam with benign findings [FreeTextEntry4] : Had audiological evaluation 03/05/2019, exam stable. Patient wears bilateral earing aids. Has broken tooth\par Had dental cleaning 01/09/2019. Saw podiatrist 01/24/2019 [FreeTextEntry8] : Last GYN exam 04/14/2017, no bleeding. [de-identified] : Saw  podiatrist 07/22/2019 [FreeTextEntry9] : Improved low back pain after epidural injection. See interval history. [de-identified] : Unchanged Numbness in bilateral hand  and finger tips worse at HS. Forgetful at times. [de-identified] : 'I was born worrying".

## 2019-07-26 NOTE — HISTORY OF PRESENT ILLNESS
[Disease: _____________________] : Disease: [unfilled] [T: ___] : T[unfilled] [N: ___] : N[unfilled] [M: ___] : M[unfilled] [AJCC Stage: ____] : AJCC Stage: [unfilled] [Treatment Protocol] : Treatment Protocol [Therapy: ___] : Therapy: [unfilled] [de-identified] : The patient presented in 2002, at age 74, with an abnormal screening mammogram of the right breast.\par \par \par \par Ultrasound-guided core biopsy on July 18, 2002 demonstrated infiltrating lobular carcinoma which was ER positive (90%), NC positive (60%) and HER-2/yony positive (3+ by IHC).\par \par \par \par Dr. Mai Brown performed lumpectomy and sentinel lymph node biopsy on August 12, 2002. Pathology was positive for a 1.5 cm infiltrating lobular carcinoma with negative margins. The tumor was also ER positive (90%), NC positive (70%) and HER-2/yony 3+. 1/2 sentinel lymph nodes was positive for metastatic disease. The patient underwent completion axillary lymph node dissection in September 9, 2002 which demonstrated 26 negative nodes.\par \par \par \par The patient received 5 cycles of adjuvant AC chemotherapy extending from November 4, 2002 through January 27, 2002. She received a cumulative dose of 490 mg (300 mg/meter squared) of Adriamycin.\par \par \par \par The patient received radiation [facility] in [place] under the supervision of Dr. Francia Mckeon.\par \par \par \par The patient subsequently took anastrozole from May 2003 through June 2008.\par \par \par \par The patient was found to have bone metastases on CT scan of her spine in January 2016. She initiated treatment with anastrozole and Xgeva in February 2016. Anastrozole was discontinued on 5/17/2017 because of progression in bone and fulvestrant was initiated on that date. [de-identified] : Infiltrating lobular carcinoma ER positive WV positive HER-2/yony positive [FreeTextEntry1] : CAF x 5 11/4/2002 - 1/27/2003\par Anastrozole 5/2003 - 6/2008, Resumed anastrozole 2/22/2016 - 5/17/2017\par Xgeva start 2/26/2016 - 2019, fractured tooth possibly needing extraction\par Fulvestrant start 5/17/2017 - 4/4/2019\par Ibrance start 4/19/2018 - 5/202019\par Xeloda start 5/9/2019. [de-identified] : The patient is 17  years 1 month post initial diagnosis of breast cancer  and  3 years   since diagnosis of bone metastases.\par \par The patient has been on Xeloda 500 mg BID for first cycle, 1000 mg BID for cycle 2. 1 week on 1 week off. Completed  3rd  cycle yesterday. No adverse effects. To start 4th cycle 08/01/2019\par \par Had transfusion of 1 unit PRBC on 6/26/2019, no significant improvement. H/H 7/22/2019 was 9.3/30.1.\par \par Most recent Ca 27.29 on 4/25/2019 was 151.3.\par \par Most recent PET CT dated  4/22/2019 demonstrated progression of osseous metastases.\par \par Patient has MRI Lumbar  Spine 5/10/2019, stable.Was seen by orthopedics who felt they had nothing else to offer.. Saw neurosurgeon on 7/10/201,Dr Michael Louis, patient was referred to pain  management Dr Venkat Schulte and was seen by pain management 07/17/2019 and was administered epidural injection. Felt better after epidural.Next f/u with Dr Schulte  07/31/2019\par \par Has follow up with Dr Hinojosa on 8/22/2019.\par \par Main complaint still remains fatigue. \par \par On 06/24/2018  H/H  8.4/27 H/H was  7.9/25.4. 5/22/2019, and the patient was transfused with 1 packed cells on 5/51/2019.\par \par Unchanged pain in lower back radiation down her lower extremities, patient feels recent epidural injection from Dr Schulte  only gave partial relief..\par \par The patient states on 8/01/2019 she will be eligible for  community medicaid  with possibility of getting a HHA and other home care services. Home care services, PT, visiting nurse and social work services  ended 05/28/2019.\par \par Saw her cardiologist Dr Carlos Wagner 06/21/2019 for  f/u, exam stable. 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 \par \par No other interval events since last visit.

## 2019-07-26 NOTE — PHYSICAL EXAM
[Restricted in physically strenuous activity but ambulatory and able to carry out work of a light or sedentary nature] : Status 1- Restricted in physically strenuous activity but ambulatory and able to carry out work of a light or sedentary nature, e.g., light house work, office work [Thin] : thin [Normal] : affect appropriate [de-identified] : Right breast: scars LOQ and Axilla, no breast mass. Left breast: no mass [de-identified] : + straight leg raising sign in distribution of lateral femoral cutaneous nerve at 45 degrees left leg. Vertebral column nontender.

## 2019-08-22 ENCOUNTER — APPOINTMENT (OUTPATIENT)
Dept: HEMATOLOGY ONCOLOGY | Facility: CLINIC | Age: 84
End: 2019-08-22

## 2019-09-04 ENCOUNTER — OUTPATIENT (OUTPATIENT)
Dept: OUTPATIENT SERVICES | Facility: HOSPITAL | Age: 84
LOS: 1 days | Discharge: HOME | End: 2019-09-04

## 2019-09-04 DIAGNOSIS — Z98.89 OTHER SPECIFIED POSTPROCEDURAL STATES: Chronic | ICD-10-CM

## 2019-09-04 DIAGNOSIS — C79.51 SECONDARY MALIGNANT NEOPLASM OF BONE: ICD-10-CM

## 2019-09-04 DIAGNOSIS — C50.911 MALIGNANT NEOPLASM OF UNSPECIFIED SITE OF RIGHT FEMALE BREAST: ICD-10-CM

## 2019-09-19 NOTE — HISTORY OF PRESENT ILLNESS
[Disease: _____________________] : Disease: [unfilled] [T: ___] : T[unfilled] [N: ___] : N[unfilled] [M: ___] : M[unfilled] [AJCC Stage: ____] : AJCC Stage: [unfilled] [Treatment Protocol] : Treatment Protocol [Therapy: ___] : Therapy: [unfilled] [de-identified] : The patient presented in 2002, at age 74, with an abnormal screening mammogram of the right breast.\par \par \par \par Ultrasound-guided core biopsy on July 18, 2002 demonstrated infiltrating lobular carcinoma which was ER positive (90%), FL positive (60%) and HER-2/yony positive (3+ by IHC).\par \par \par \par Dr. Mai Brown performed lumpectomy and sentinel lymph node biopsy on August 12, 2002. Pathology was positive for a 1.5 cm infiltrating lobular carcinoma with negative margins. The tumor was also ER positive (90%), FL positive (70%) and HER-2/yony 3+. 1/2 sentinel lymph nodes was positive for metastatic disease. The patient underwent completion axillary lymph node dissection in September 9, 2002 which demonstrated 26 negative nodes.\par \par \par \par The patient received 5 cycles of adjuvant AC chemotherapy extending from November 4, 2002 through January 27, 2002. She received a cumulative dose of 490 mg (300 mg/meter squared) of Adriamycin.\par \par \par \par The patient received radiation therapy [facility] in [place] under the supervision of Dr. Francia Mckeon.\par \par \par \par The patient subsequently took anastrozole from May 2003 through June 2008.\par \par \par \par The patient was found to have bone metastases on CT scan of her spine in January 2016. She initiated treatment with anastrozole and Xgeva in February 2016. Anastrozole was discontinued on 5/17/2017 because of progression in bone and fulvestrant was initiated on that date. [de-identified] : Infiltrating lobular carcinoma ER positive WV positive HER-2/yony positive [FreeTextEntry1] : CAF x 5 11/4/2002 - 1/27/2003\par Anastrozole 5/2003 - 6/2008, Resumed anastrozole 2/22/2016 - 5/17/2017\par Xgeva start 2/26/2016 - 2019, fractured tooth possibly needing extraction\par Fulvestrant start 5/17/2017 - 4/4/2019\par Ibrance start 4/19/2018 - 5/202019\par Xeloda start 5/9/2019. [de-identified] : The patient is 17  years 3 months post initial diagnosis of breast cancer  and  3 years 8 months  since diagnosis of bone metastases.\par \par The patient has been on Xeloda 500 mg BID for first cycle, 1000 mg BID for cycle 2. 1 week on 1 week off. Most recent cycle of Xeloda 1500 mg BID completed 8/7/2019.\par \par 8/11/2019 developed abdominal discomfort and nausea Was seen by Dr Reddy Waterman on 8/12/2019. Was told to go to ER on 8/12/2019, was doubled over with pain and vomiting.  CT scan: diffuse small bowel thickening consistent with enteritis, discharged on Augmentin.  Was still unable to keep anything down,\par \par On 8/13/2019 returned to ER and admitted [facility] -8/19/2019.  Ws given 1 U PRBC 8/19/2019 for Hgb of 7.1 and transferred to  [place] re[facility] on 8/20/2109. Patient responded to antibiotics. Patient initially looked preterminal, she was lethargic and signed a DNR order.  Abdominal pain resolved while in rehab\par \par CBC on 9/13/2019 demonstrated hgb on 6.8/2019, readmitted to [facility] and had 1 unit of PRBC.  \par Was given erythropoietin at the rehab facility. Most recent Hgb on 9/15 was 9.5\par \par Had thrush treated with nystatin.\par \par \par \par \par

## 2019-09-19 NOTE — PHYSICAL EXAM
[Thin] : thin [Capable of only limited self care, confined to bed or chair more than 50% of waking hours] : Status 3- Capable of only limited self care, confined to bed or chair more than 50% of waking hours [Normal] : full range of motion and no deformities appreciated [Ulcers] : no ulcers [Mucositis] : no mucositis [Thrush] : no thrush [Vesicles] : no vesicles [de-identified] : Weight decreased 2 kg since last visit [de-identified] : Right breast: scars LOQ and Axilla, no breast mass. Left breast: no mass

## 2019-09-19 NOTE — REVIEW OF SYSTEMS
[SOB on Exertion] : shortness of breath during exertion [Anxiety] : anxiety [Depression] : depression [Negative] : Allergic/Immunologic [Mucosal Pain] : no mucosal pain [Shortness Of Breath] : no shortness of breath [Insomnia] : no insomnia [FreeTextEntry2] : .Fatigue, See interval history,S/P flu vaccination 10/9/2018. [FreeTextEntry3] : Last eye exam with benign findings [FreeTextEntry4] : \par Had dental cleaning 01/09/2019. Saw podiatrist 01/24/2019 [FreeTextEntry8] : Last GYN exam 04/14/2017, no bleeding. [FreeTextEntry9] : Currently not having back pain [de-identified] : Saw  podiatrist 07/22/2019 [de-identified] :  Numbness in bilateral hand  and finger tips worse at HS. Increased forgetfulness. [de-identified] : 'I was born worrying".

## 2019-10-17 PROBLEM — R21 RASH: Status: ACTIVE | Noted: 2019-01-01

## 2019-10-17 NOTE — HISTORY OF PRESENT ILLNESS
[Disease: _____________________] : Disease: [unfilled] [T: ___] : T[unfilled] [N: ___] : N[unfilled] [M: ___] : M[unfilled] [AJCC Stage: ____] : AJCC Stage: [unfilled] [Treatment Protocol] : Treatment Protocol [Therapy: ___] : Therapy: [unfilled] [de-identified] : Infiltrating lobular carcinoma ER positive MD positive HER-2/yony positive [de-identified] : The patient presented in 2002, at age 74, with an abnormal screening mammogram of the right breast.\par \par \par \par Ultrasound-guided core biopsy on July 18, 2002 demonstrated infiltrating lobular carcinoma which was ER positive (90%), MT positive (60%) and HER-2/yony positive (3+ by IHC).\par \par \par \par Dr. Mai Brown performed lumpectomy and sentinel lymph node biopsy on August 12, 2002. Pathology was positive for a 1.5 cm infiltrating lobular carcinoma with negative margins. The tumor was also ER positive (90%), MT positive (70%) and HER-2/yony 3+. 1/2 sentinel lymph nodes was positive for metastatic disease. The patient underwent completion axillary lymph node dissection in September 9, 2002 which demonstrated 26 negative nodes.\par \par \par \par The patient received 5 cycles of adjuvant AC chemotherapy extending from November 4, 2002 through January 27, 2002. She received a cumulative dose of 490 mg (300 mg/meter squared) of Adriamycin.\par \par \par \par The patient received radiation therapy [facility] in [place] under the supervision of Dr. Francia Mckeon.\par \par \par \par The patient subsequently took anastrozole from May 2003 through June 2008.\par \par \par \par The patient was found to have bone metastases on CT scan of her spine in January 2016. She initiated treatment with anastrozole and Xgeva in February 2016. Anastrozole was discontinued on 5/17/2017 because of progression in bone and fulvestrant was initiated on that date. [FreeTextEntry1] : CAF x 5 11/4/2002 - 1/27/2003\par Anastrozole 5/2003 - 6/2008, Resumed anastrozole 2/22/2016 - 5/17/2017\par Xgeva start 2/26/2016 - 2019, fractured tooth possibly needing extraction\par Fulvestrant start 5/17/2017 - 4/4/2019\par Ibrance start 4/19/2018 - 5/202019\par Xeloda start 5/9/2019. [de-identified] : The patient is 17  years 4  months post initial diagnosis of breast cancer  and  3 years 8 months  since diagnosis of bone metastases.\par \par The patient has been on Xeloda 500 mg BID for first cycle, 1000 mg BID for cycle 2. 1 week on 1 week off. Most recent cycle of Xeloda 1500 mg BID completed 8/7/2019.\par \par 8/11/2019 developed abdominal discomfort and nausea Was seen by Dr Reddy Waterman on 8/12/2019. Was told to go to ER on 8/12/2019, was doubled over with pain and vomiting.  CT scan: diffuse small bowel thickening consistent with enteritis, discharged on Augmentin.  Was still unable to keep anything down.\par \par On 8/13/2019 returned to ER and admitted [facility] -8/19/2019.  Ws given 1 U PRBC 8/19/2019 for Hgb of 7.1 and transferred to [place] rehab on 8/20/2109. Patient responded to antibiotics. Patient initially looked preterminal, she was lethargic and signed a DNR order.  Abdominal pain resolved while in rehab\par \par CBC on 9/13/2019 demonstrated hgb on 6.8/2019, readmitted to [facility] and had 1 unit of PRBC.  \par Was given erythropoietin at the rehab facility. Most recent Hgb on 9/15 was 9.5\par \par Had thrush treated with nystatin which is now resolved.\par \par Patient had a PET/CT on 9/23/19 at [place] which revealed unchanged osseous mets and improved FDG avidity. Patient stopped taking Ferrous Sulfate due to normal Iron levels. She was discharged from Rehab on 9/25 and received a Flu shot with no issues. Patient now receiving [facility] home care services 7 days a week, 35 hours a week. \par \par Patient has left eye conjunctivitis that started on 10/14 which worsened now spread to the right eye. PCP prescribed Polymyxin B Sulfate and Trimethoprim 3X a day one drop each eye. Dr. Baez also ordered Famotidine 40mg in place of Pepcid. \par Patient was also complaining of left foot pain and swelling, was ordered for an Xray, negative for fracture, applied an Unna boot, prescribed Bactrim, but Bactrim was stopped as symptoms resolved on 10/11. \par Patient discovered a new rash under her right breast on 10/14, mildly pruritic, not painful but erythematous. Has never had shingles in the past, and has had Shingrix vaccine. Unclear if shingles or fungal infection.

## 2019-10-17 NOTE — PHYSICAL EXAM
[Capable of only limited self care, confined to bed or chair more than 50% of waking hours] : Status 3- Capable of only limited self care, confined to bed or chair more than 50% of waking hours [Thin] : thin [Normal] : clear to auscultation bilaterally, no dullness, no wheezing [Ulcers] : no ulcers [Mucositis] : no mucositis [Thrush] : no thrush [Vesicles] : no vesicles [de-identified] : Weight decreased 2 kg since last visit [de-identified] : Right breast: scars LOQ and Axilla, no breast mass. Left breast: no mass [de-identified] : Bilateral eye redness, clear drainage, EOMI normal  [de-identified] : Rash under right breast, erythematous, non- pruritic, excoriations present

## 2019-10-17 NOTE — REVIEW OF SYSTEMS
[SOB on Exertion] : shortness of breath during exertion [Anxiety] : anxiety [Depression] : depression [Negative] : Allergic/Immunologic [Red Eyes] : red eyes [Skin Rash] : skin rash [Eye Pain] : no eye pain [Dry Eyes] : no dryness of the eyes [Vision Problems] : no vision problems [Shortness Of Breath] : no shortness of breath [Mucosal Pain] : no mucosal pain [Abdominal Pain] : no abdominal pain [Vomiting] : no vomiting [Diarrhea] : no diarrhea [Insomnia] : no insomnia [FreeTextEntry4] : \par Had dental cleaning 01/09/2019. Saw podiatrist 01/24/2019 [FreeTextEntry2] : Fatigue, See interval history,S/P flu vaccination 9/25/2019. [FreeTextEntry3] : Left and Right eye conjunctivitis  [FreeTextEntry8] : Last GYN exam 04/14/2017, no bleeding. [FreeTextEntry9] : Currently not having back pain [de-identified] :  Numbness in bilateral hand  and finger tips worse at HS. Increased forgetfulness. [de-identified] : Saw podiatrist 10/11/19 for cellulitis, prescribed Bactrim, now resolved. New Skin rash under right breast [de-identified] : 'I was born worrying".

## 2019-11-15 NOTE — REVIEW OF SYSTEMS
[Red Eyes] : red eyes [SOB on Exertion] : shortness of breath during exertion [Skin Rash] : skin rash [Anxiety] : anxiety [Depression] : depression [Negative] : Allergic/Immunologic [Eye Pain] : no eye pain [Mucosal Pain] : no mucosal pain [Dry Eyes] : no dryness of the eyes [Vision Problems] : no vision problems [Abdominal Pain] : no abdominal pain [Shortness Of Breath] : no shortness of breath [Vomiting] : no vomiting [Insomnia] : no insomnia [Diarrhea] : no diarrhea [FreeTextEntry2] : Fatigue, See interval history,S/P flu vaccination 9/25/2019. [FreeTextEntry3] : Left and Right eye conjunctivitis  [FreeTextEntry4] : \par Had dental cleaning 01/09/2019. Saw podiatrist 01/24/2019 [FreeTextEntry8] : Last GYN exam 04/14/2017, no bleeding. [de-identified] : Saw podiatrist 10/11/19 for cellulitis, prescribed Bactrim, now resolved. New Skin rash under right breast [FreeTextEntry9] : Currently not having back pain [de-identified] : 'I was born worrying". [de-identified] :  Numbness in bilateral hand  and finger tips worse at HS. Increased forgetfulness.

## 2019-11-15 NOTE — PHYSICAL EXAM
[Capable of only limited self care, confined to bed or chair more than 50% of waking hours] : Status 3- Capable of only limited self care, confined to bed or chair more than 50% of waking hours [Thin] : thin [Normal] : affect appropriate [Mucositis] : no mucositis [Ulcers] : no ulcers [Thrush] : no thrush [Vesicles] : no vesicles [de-identified] : Bilateral eye redness, clear drainage, EOMI normal  [de-identified] : Weight decreased 2 kg since last visit [de-identified] : Right breast: scars LOQ and Axilla, no breast mass. Left breast: no mass [de-identified] : Rash under right breasthas resoved.

## 2019-11-15 NOTE — HISTORY OF PRESENT ILLNESS
[Disease: _____________________] : Disease: [unfilled] [T: ___] : T[unfilled] [N: ___] : N[unfilled] [AJCC Stage: ____] : AJCC Stage: [unfilled] [M: ___] : M[unfilled] [Treatment Protocol] : Treatment Protocol [Therapy: ___] : Therapy: [unfilled] [de-identified] : Infiltrating lobular carcinoma ER positive MD positive HER-2/yony positive [FreeTextEntry1] : CAF x 5 11/4/2002 - 1/27/2003\par Anastrozole 5/2003 - 6/2008, Resumed anastrozole 2/22/2016 - 5/17/2017\par Xgeva start 2/26/2016 - 2019, fractured tooth possibly needing extraction\par Fulvestrant start 5/17/2017 - 4/4/2019\par Ibrance start 4/19/2018 - 5/202019\par Xeloda start 5/9/2019. [de-identified] : The patient presented in 2002, at age 74, with an abnormal screening mammogram of the right breast.\par \par \par \par Ultrasound-guided core biopsy on July 18, 2002 demonstrated infiltrating lobular carcinoma which was ER positive (90%), ND positive (60%) and HER-2/yony positive (3+ by IHC).\par \par \par \par Dr. Mai Brown performed lumpectomy and sentinel lymph node biopsy on August 12, 2002. Pathology was positive for a 1.5 cm infiltrating lobular carcinoma with negative margins. The tumor was also ER positive (90%), ND positive (70%) and HER-2/yony 3+. 1/2 sentinel lymph nodes was positive for metastatic disease. The patient underwent completion axillary lymph node dissection in September 9, 2002 which demonstrated 26 negative nodes.\par \par \par \par The patient received 5 cycles of adjuvant AC chemotherapy extending from November 4, 2002 through January 27, 2002. She received a cumulative dose of 490 mg (300 mg/meter squared) of Adriamycin.\par \par \par \par The patient [place] [facility] [place] the supervision of Dr. Francia Mckeon.\par \par \par \par The patient subsequently took anastrozole from May 2003 through June 2008.\par \par \par \par The patient was found to have bone metastases on CT scan of her spine in January 2016. She initiated treatment with anastrozole and Xgeva in February 2016. Anastrozole was discontinued on 5/17/2017 because of progression in bone and fulvestrant was initiated on that date. [de-identified] : The patient is 17 years 5   months post initial diagnosis of breast cancer  and  3 years 9  months  since diagnosis of bone metastases.\par \par The patient has been on Xeloda 500 mg BID for first cycle, 1000 mg BID for cycle 2. 1 week on 1 week off. Resumed Xeloda after 2 month hiatus on 10/19/2019. Most recent cycle of Xeloda 1500 mg BID completed 11/8/2019. due to restart tomorrow.\par \par Being treated for blepharitis by ophthalmologist.\par \par Rash under her right breast noted 1 month ago  was seen by dermatologist, treated as fungal infection and resolved.

## 2020-01-01 ENCOUNTER — RESULT REVIEW (OUTPATIENT)
Age: 85
End: 2020-01-01

## 2020-01-01 ENCOUNTER — APPOINTMENT (OUTPATIENT)
Dept: INFUSION THERAPY | Facility: HOSPITAL | Age: 85
End: 2020-01-01

## 2020-01-01 ENCOUNTER — LABORATORY RESULT (OUTPATIENT)
Age: 85
End: 2020-01-01

## 2020-01-01 ENCOUNTER — APPOINTMENT (OUTPATIENT)
Dept: HEMATOLOGY ONCOLOGY | Facility: CLINIC | Age: 85
End: 2020-01-01
Payer: MEDICARE

## 2020-01-01 ENCOUNTER — OUTPATIENT (OUTPATIENT)
Dept: OUTPATIENT SERVICES | Facility: HOSPITAL | Age: 85
LOS: 1 days | Discharge: HOME | End: 2020-01-01

## 2020-01-01 ENCOUNTER — APPOINTMENT (OUTPATIENT)
Dept: HEMATOLOGY ONCOLOGY | Facility: CLINIC | Age: 85
End: 2020-01-01

## 2020-01-01 ENCOUNTER — OUTPATIENT (OUTPATIENT)
Dept: OUTPATIENT SERVICES | Facility: HOSPITAL | Age: 85
LOS: 1 days | End: 2020-01-01

## 2020-01-01 ENCOUNTER — RX RENEWAL (OUTPATIENT)
Age: 85
End: 2020-01-01

## 2020-01-01 ENCOUNTER — OUTPATIENT (OUTPATIENT)
Dept: OUTPATIENT SERVICES | Facility: HOSPITAL | Age: 85
LOS: 1 days | End: 2020-01-01
Payer: MEDICARE

## 2020-01-01 VITALS
WEIGHT: 110.23 LBS | RESPIRATION RATE: 16 BRPM | DIASTOLIC BLOOD PRESSURE: 81 MMHG | TEMPERATURE: 98.3 F | HEART RATE: 74 BPM | SYSTOLIC BLOOD PRESSURE: 118 MMHG | OXYGEN SATURATION: 98 % | BODY MASS INDEX: 22.79 KG/M2

## 2020-01-01 VITALS
DIASTOLIC BLOOD PRESSURE: 70 MMHG | RESPIRATION RATE: 17 BRPM | OXYGEN SATURATION: 100 % | HEART RATE: 67 BPM | WEIGHT: 108.47 LBS | BODY MASS INDEX: 22.43 KG/M2 | SYSTOLIC BLOOD PRESSURE: 111 MMHG | TEMPERATURE: 97.7 F

## 2020-01-01 VITALS
OXYGEN SATURATION: 99 % | HEART RATE: 68 BPM | TEMPERATURE: 97.8 F | BODY MASS INDEX: 22.34 KG/M2 | SYSTOLIC BLOOD PRESSURE: 122 MMHG | DIASTOLIC BLOOD PRESSURE: 62 MMHG | RESPIRATION RATE: 16 BRPM | WEIGHT: 108.02 LBS

## 2020-01-01 DIAGNOSIS — M54.9 DORSALGIA, UNSPECIFIED: ICD-10-CM

## 2020-01-01 DIAGNOSIS — M25.551 PAIN IN RIGHT HIP: ICD-10-CM

## 2020-01-01 DIAGNOSIS — Z98.89 OTHER SPECIFIED POSTPROCEDURAL STATES: Chronic | ICD-10-CM

## 2020-01-01 DIAGNOSIS — C50.911 MALIGNANT NEOPLASM OF UNSPECIFIED SITE OF RIGHT FEMALE BREAST: ICD-10-CM

## 2020-01-01 DIAGNOSIS — S02.5XXA FRACTURE OF TOOTH (TRAUMATIC), INITIAL ENCOUNTER FOR CLOSED FRACTURE: ICD-10-CM

## 2020-01-01 DIAGNOSIS — C79.51 SECONDARY MALIGNANT NEOPLASM OF BONE: ICD-10-CM

## 2020-01-01 DIAGNOSIS — Z51.5 ENCOUNTER FOR PALLIATIVE CARE: ICD-10-CM

## 2020-01-01 DIAGNOSIS — D64.9 ANEMIA, UNSPECIFIED: ICD-10-CM

## 2020-01-01 DIAGNOSIS — M54.10 RADICULOPATHY, SITE UNSPECIFIED: ICD-10-CM

## 2020-01-01 DIAGNOSIS — C79.51 MALIGNANT NEOPLASM OF UNSPECIFIED SITE OF RIGHT FEMALE BREAST: ICD-10-CM

## 2020-01-01 DIAGNOSIS — Z91.81 HISTORY OF FALLING: ICD-10-CM

## 2020-01-01 DIAGNOSIS — Z79.899 OTHER LONG TERM (CURRENT) DRUG THERAPY: ICD-10-CM

## 2020-01-01 DIAGNOSIS — E83.52 HYPERCALCEMIA: ICD-10-CM

## 2020-01-01 DIAGNOSIS — Z51.89 ENCOUNTER FOR OTHER SPECIFIED AFTERCARE: ICD-10-CM

## 2020-01-01 DIAGNOSIS — Z78.9 OTHER SPECIFIED HEALTH STATUS: ICD-10-CM

## 2020-01-01 DIAGNOSIS — G89.29 DORSALGIA, UNSPECIFIED: ICD-10-CM

## 2020-01-01 LAB
BLD GP AB SCN SERPL QL: NEGATIVE — SIGNIFICANT CHANGE UP
BLD GP AB SCN SERPL QL: NEGATIVE — SIGNIFICANT CHANGE UP
CANCER AG27-29 SERPL-ACNC: 198 U/ML
HCT VFR BLD CALC: 23 % — LOW (ref 34.5–45)
HCT VFR BLD CALC: 24.2 % — LOW (ref 34.5–45)
HGB BLD-MCNC: 7.7 G/DL — LOW (ref 11.5–15.5)
HGB BLD-MCNC: 8.1 G/DL — LOW (ref 11.5–15.5)
MCHC RBC-ENTMCNC: 32.2 PG — SIGNIFICANT CHANGE UP (ref 27–34)
MCHC RBC-ENTMCNC: 32.6 PG — SIGNIFICANT CHANGE UP (ref 27–34)
MCHC RBC-ENTMCNC: 33.5 G/DL — SIGNIFICANT CHANGE UP (ref 32–36)
MCHC RBC-ENTMCNC: 33.6 G/DL — SIGNIFICANT CHANGE UP (ref 32–36)
MCV RBC AUTO: 96 FL — SIGNIFICANT CHANGE UP (ref 80–100)
MCV RBC AUTO: 97.2 FL — SIGNIFICANT CHANGE UP (ref 80–100)
PLATELET # BLD AUTO: 136 K/UL — LOW (ref 150–400)
PLATELET # BLD AUTO: 146 K/UL — LOW (ref 150–400)
RBC # BLD: 2.37 M/UL — LOW (ref 3.8–5.2)
RBC # BLD: 2.52 M/UL — LOW (ref 3.8–5.2)
RBC # FLD: 17.6 % — HIGH (ref 10.3–14.5)
RBC # FLD: 18.8 % — HIGH (ref 10.3–14.5)
RH IG SCN BLD-IMP: POSITIVE — SIGNIFICANT CHANGE UP
RH IG SCN BLD-IMP: POSITIVE — SIGNIFICANT CHANGE UP
WBC # BLD: 4.3 K/UL — SIGNIFICANT CHANGE UP (ref 3.8–10.5)
WBC # BLD: 4.9 K/UL — SIGNIFICANT CHANGE UP (ref 3.8–10.5)
WBC # FLD AUTO: 4.3 K/UL — SIGNIFICANT CHANGE UP (ref 3.8–10.5)
WBC # FLD AUTO: 4.9 K/UL — SIGNIFICANT CHANGE UP (ref 3.8–10.5)

## 2020-01-01 PROCEDURE — 86901 BLOOD TYPING SEROLOGIC RH(D): CPT

## 2020-01-01 PROCEDURE — 99214 OFFICE O/P EST MOD 30 MIN: CPT

## 2020-01-01 PROCEDURE — 86900 BLOOD TYPING SEROLOGIC ABO: CPT

## 2020-01-01 PROCEDURE — 99215 OFFICE O/P EST HI 40 MIN: CPT

## 2020-01-01 PROCEDURE — 99214 OFFICE O/P EST MOD 30 MIN: CPT | Mod: 25,95,GW

## 2020-01-01 PROCEDURE — 99214 OFFICE O/P EST MOD 30 MIN: CPT | Mod: 95

## 2020-01-01 PROCEDURE — 86850 RBC ANTIBODY SCREEN: CPT

## 2020-01-01 PROCEDURE — 86923 COMPATIBILITY TEST ELECTRIC: CPT

## 2020-01-01 PROCEDURE — 99213 OFFICE O/P EST LOW 20 MIN: CPT | Mod: 95,GV

## 2020-01-01 RX ORDER — OMEPRAZOLE 20 MG/1
20 CAPSULE, DELAYED RELEASE ORAL
Refills: 0 | Status: ACTIVE | COMMUNITY
Start: 2020-01-01

## 2020-01-01 RX ORDER — HYDROCODONE BITARTRATE AND ACETAMINOPHEN 5; 325 MG/1; MG/1
5-325 TABLET ORAL
Qty: 60 | Refills: 0 | Status: ACTIVE | COMMUNITY
Start: 2020-01-01

## 2020-01-01 RX ORDER — GABAPENTIN 100 MG/1
100 CAPSULE ORAL
Refills: 0 | Status: ACTIVE | COMMUNITY
Start: 2019-07-25

## 2020-01-01 RX ORDER — ELECTROLYTES/DEXTROSE
SOLUTION, ORAL ORAL
Refills: 0 | Status: DISCONTINUED | COMMUNITY
Start: 2017-08-16 | End: 2020-01-01

## 2020-01-01 RX ORDER — QUETIAPINE 25 MG/1
25 TABLET, FILM COATED ORAL
Refills: 0 | Status: ACTIVE | COMMUNITY
Start: 2020-01-01

## 2020-01-01 RX ORDER — FAMOTIDINE 40 MG/1
40 TABLET, FILM COATED ORAL DAILY
Refills: 0 | Status: DISCONTINUED | COMMUNITY
Start: 2019-01-01 | End: 2020-01-01

## 2020-01-01 RX ORDER — CLONAZEPAM 0.5 MG/1
0.5 TABLET ORAL
Qty: 30 | Refills: 0 | Status: ACTIVE | COMMUNITY
Start: 2018-03-09 | End: 1900-01-01

## 2020-01-01 RX ORDER — DESMOPRESSIN ACETATE 0.2 MG/1
0.2 TABLET ORAL
Refills: 0 | Status: ACTIVE | COMMUNITY
Start: 2020-01-01

## 2020-01-01 RX ORDER — DOCUSATE SODIUM 50 MG SENNOSIDES 8.6 MG 8.6; 5 MG/1; MG/1
8.6-5 TABLET, FILM COATED ORAL
Refills: 0 | Status: ACTIVE | COMMUNITY
Start: 2020-01-01

## 2020-01-01 RX ORDER — LACTULOSE SOLUTION USP, 10 G/15 ML 10 G/15ML
10 SOLUTION ORAL; RECTAL
Qty: 1 | Refills: 6 | Status: ACTIVE | COMMUNITY
Start: 2020-01-01

## 2020-01-01 RX ORDER — NAFTIFINE HYDROCHLORIDE 10 MG/G
1 GEL TOPICAL
Refills: 0 | Status: ACTIVE | COMMUNITY
Start: 2020-01-01

## 2020-01-01 RX ORDER — ONDANSETRON 4 MG/1
4 TABLET ORAL
Qty: 30 | Refills: 6 | Status: DISCONTINUED | COMMUNITY
Start: 2019-05-02 | End: 2020-01-01

## 2020-01-01 RX ORDER — HYDROCODONE BITARTRATE AND ACETAMINOPHEN 5; 300 MG/1; MG/1
5-300 TABLET ORAL
Qty: 60 | Refills: 0 | Status: DISCONTINUED | COMMUNITY
Start: 2018-04-10 | End: 2020-01-01

## 2020-01-01 RX ORDER — CAPECITABINE 500 MG/1
500 TABLET ORAL
Qty: 84 | Refills: 1 | Status: DISCONTINUED | COMMUNITY
Start: 2019-05-02 | End: 2020-01-01

## 2020-01-01 RX ORDER — ACETAMINOPHEN 500 MG/1
500 TABLET, COATED ORAL
Refills: 0 | Status: ACTIVE | COMMUNITY
Start: 2020-01-01

## 2020-01-19 NOTE — HISTORY OF PRESENT ILLNESS
[de-identified] : The patient presented in 2002, at age 74, with an abnormal screening mammogram of the right breast.\par \par \par \par Ultrasound-guided core biopsy on July 18, 2002 demonstrated infiltrating lobular carcinoma which was ER positive (90%), NE positive (60%) and HER-2/yony positive (3+ by IHC).\par \par \par \par Dr. Mai Brown performed lumpectomy and sentinel lymph node biopsy on August 12, 2002. Pathology was positive for a 1.5 cm infiltrating lobular carcinoma with negative margins. The tumor was also ER positive (90%), NE positive (70%) and HER-2/yony 3+. 1/2 sentinel lymph nodes was positive for metastatic disease. The patient underwent completion axillary lymph node dissection in September 9, 2002 which demonstrated 26 negative nodes.\par \par \par \par The patient received 5 cycles of adjuvant AC chemotherapy extending from November 4, 2002 through January 27, 2002. She received a cumulative dose of 490 mg (300 mg/meter squared) of Adriamycin.\par \par \par \par The patient received radiation therapy [facility] in [place] under the supervision of Dr. Francia Mckeon.\par \par \par \par The patient subsequently took anastrozole from May 2003 through June 2008.\par \par \par \par The patient was found to have bone metastases on CT scan of her spine in January 2016. She initiated treatment with anastrozole and Xgeva in February 2016. Anastrozole was discontinued on 5/17/2017 because of progression in bone and fulvestrant was initiated on that date. [FreeTextEntry1] : CAF x 5 11/4/2002 - 1/27/2003\par Anastrozole 5/2003 - 6/2008, Resumed anastrozole 2/22/2016 - 5/17/2017\par Xgeva start 2/26/2016 - 2019, fractured tooth possibly needing extraction\par Fulvestrant start 5/17/2017 - 4/4/2019\par Ibrance start 4/19/2018 - 5/202019\par Xeloda start 5/9/2019. [de-identified] : Infiltrating lobular carcinoma ER positive [place] positive HER-2/yony positive [de-identified] : The patient is 17 years 6  months post initial diagnosis of breast cancer  and  3 years 10 months  since diagnosis of bone metastases.\par \par The patient has been on Xeloda 500 mg BID for first cycle, 1000 mg BID for cycle 2, 1 week on 1 week off. Resumed Xeloda after 2 month hiatus on 10/19/2019. The patient initiated new cycle of Xeloda 1500 mg BID on 12/28/2019. The patient completed most recent Xeloda  cycle 01/03/2020.  Tentatively due to start new cycle 01/11/2020.\par Now on off week\par \par Most recent CA 27.29 on 12/6/2019 decreased to 208.6, was 256.4 on 11/8/2019 .\par \par Being treated for blepharitis by ophthalmologist  prn.\par \par Today Hgb/Hct decreased to  8.1/24.2 was 8.3/26.2 on 01/02/2020  \par \par Scheduled PET/CT 12/16/2019 at [facility].\par \par Patient has ongoing fatigue, although patient states her energy has improved since last seen.\par \par The patient states Pain radiating down LLE has improved since last seen .Had  MRI lumbar spine 12/20/2019 which demonstrated large central and left sided disc  herniation with significant cord compression of the thecal sac and left L2 nerve root that is increased in size from the most recent  prior MRI.\par No other interval event since last seen.

## 2020-01-19 NOTE — REVIEW OF SYSTEMS
[Dry Eyes] : no dryness of the eyes [Eye Pain] : no eye pain [Mucosal Pain] : no mucosal pain [Vision Problems] : no vision problems [Shortness Of Breath] : no shortness of breath [Abdominal Pain] : no abdominal pain [Diarrhea] : no diarrhea [Vomiting] : no vomiting [FreeTextEntry3] : Left and Right eye conjunctivitis has resolved .Being treated for blepharitis by ophthalmologist  prn. Using Maxitrol and Avenova gtts PRN [FreeTextEntry2] : Energy  has improved  since last seen.See interval history,S/P flu vaccination 9/25/2019. [Insomnia] : no insomnia [FreeTextEntry9] : Currently having intermittent left hip pain radiating down lower leg. Back pain has resolved. [de-identified] : Saw podiatrist 10/11/19 for cellulitis, prescribed Bactrim, now resolved. New Skin rash under right breast has resolved. [FreeTextEntry8] : Last GYN exam 04/14/2017, no bleeding. [FreeTextEntry4] : \par Had dental cleaning 01/09/2019. Saw podiatrist 01/24/2019 [de-identified] :  Unchanged Numbness in bilateral hand  and finger tips worse at HS. Increased forgetfulness. [de-identified] : 'I was born worrying".

## 2020-01-19 NOTE — PHYSICAL EXAM
[Normal] : PERRL, EOMI, no conjunctival infection, anicteric [Mucositis] : no mucositis [Ulcers] : no ulcers [Vesicles] : no vesicles [de-identified] : Weight decreased 2 kg since last visit [Thrush] : no thrush [de-identified] : Pitting edema to knees bilaterally, no calf tenderness. [de-identified] : Right breast: scars LOQ and Axilla, no breast mass. Left breast: no mass [de-identified] : Straight leg raising sign + at 45 degrees LLE

## 2020-02-07 PROBLEM — M25.551 RIGHT HIP PAIN: Status: ACTIVE | Noted: 2020-01-01

## 2020-02-07 NOTE — REVIEW OF SYSTEMS
[Eye Pain] : no eye pain [Dry Eyes] : no dryness of the eyes [Vision Problems] : no vision problems [Mucosal Pain] : no mucosal pain [Shortness Of Breath] : no shortness of breath [Abdominal Pain] : no abdominal pain [Vomiting] : no vomiting [Diarrhea] : no diarrhea [Insomnia] : no insomnia [FreeTextEntry2] : Energy  has improved  since last seen.See interval history,S/P flu vaccination 9/25/2019. [FreeTextEntry3] : Using compresses for blepharitis. [FreeTextEntry4] : Has sore in corner of mouth for 2-3 weeks.\par Had dental cleaning 01/09/2019. Saw podiatrist 01/24/2019 [FreeTextEntry8] : Last GYN exam 04/14/2017, no bleeding. [FreeTextEntry9] : Currently having intermittent left hip pain radiating down lower leg. Back pain has resolved. [de-identified] : Saw podiatrist 1/26/2020 and treating fungal infection of toes/nails [de-identified] : Unchanged Numbness in bilateral hand  and finger tips worse at HS. Increased forgetfulness. short term memory "very impaired" as per daughter. [de-identified] : 'I was born worrying".

## 2020-02-07 NOTE — PHYSICAL EXAM
[Fully active, able to carry on all pre-disease performance without restriction] : Status 0 - Fully active, able to carry on all pre-disease performance without restriction [Ulcers] : no ulcers [Mucositis] : no mucositis [Thrush] : no thrush [Vesicles] : no vesicles [de-identified] : Weight decreased 2 kg since last visit [de-identified] : Cheilitis right side of mouth [de-identified] : Asymmetrical edema of legs L1+, Right trace, no calf tenderness. [de-identified] : Right breast: scars LOQ and Axilla, no breast mass. Left breast: no mass

## 2020-02-07 NOTE — ASSESSMENT
[Supportive] : Goals of care discussed with patient: Supportive [Palliative Care Plan] : Patient was apprised of incurable nature of disease.  Hospice and Palliative care options discussed. [With Patient/Caregiver] : : With Patient/Caregiver [Name: ___] : Name: [unfilled] [Designated Health Care Proxy] : Designated Health Care Proxy [Relationship: ___] : Relationship: [unfilled] [FreeTextEntry1] : We discussed the fact patient has progressive disease and is unlikely to respond to further anti neoplastic therapy 2/6/2020. Patient and daughter given MOLST form to complete. Patient does not want resuscitation (on form she brought). [AdvancecareDate] : 2/6/2020 [FreeTextEntry2] : The patient brought in her living will which will be scanned into her chart. She was given a MOLST form and will fill it out and bring it with her future visits.

## 2020-02-07 NOTE — HISTORY OF PRESENT ILLNESS
[de-identified] : The patient presented in 2002, at age 74, with an abnormal screening mammogram of the right breast.\par \par \par \par Ultrasound-guided core biopsy on July 18, 2002 demonstrated infiltrating lobular carcinoma which was ER positive (90%), [place] positive (60%) and HER-2/yony positive (3+ by IHC).\par \par \par \par Dr. Mai Brown performed lumpectomy and sentinel lymph node biopsy on August 12, 2002. Pathology was positive for a 1.5 cm infiltrating lobular carcinoma with negative margins. The tumor was also ER positive (90%), [place] positive (70%) and HER-2/yony 3+. 1/2 sentinel lymph nodes was positive for metastatic disease. The patient underwent completion axillary lymph node dissection in September 9, 2002 which demonstrated 26 negative nodes.\par \par \par \par The patient received 5 cycles of adjuvant AC chemotherapy extending from November 4, 2002 through January 27, 2002. She received a cumulative dose of 490 mg (300 mg/meter squared) of Adriamycin.\par \par \par \par The patient received radiation therapy [facility] in [place] under the supervision of Dr. Francia Mckeon.\par \par \par \par The patient subsequently took anastrozole from May 2003 through June 2008.\par \par \par \par The patient was found to have bone metastases on CT scan of her spine in January 2016. She initiated treatment with anastrozole and Xgeva in February 2016. Anastrozole was discontinued on 5/17/2017 because of progression in bone and fulvestrant was initiated on that date. [de-identified] : Infiltrating lobular carcinoma ER positive [place] positive HER-2/yony positive [FreeTextEntry1] : CAF x 5 11/4/2002 - 1/27/2003\par Anastrozole 5/2003 - 6/2008, Resumed anastrozole 2/22/2016 - 5/17/2017\par Xgeva start 2/26/2016 - 2019, fractured tooth possibly needing extraction\par Fulvestrant start 5/17/2017 - 4/4/2019\par Ibrance start 4/19/2018 - 5/202019\par Xeloda start 5/9/2019. [de-identified] : The patient is 17 years 7  months post initial diagnosis of breast cancer  and  3 years 11 months  since diagnosis of bone metastases.\par \par The patient has been on Xeloda 500 mg BID for first cycle, 1000 mg BID for cycle 2, 1 week on 1 week off. Resumed Xeloda after 2 month hiatus on 10/19/2019. The patient initiated new cycle of Xeloda 1500 mg BID on 12/28/2019. The patient completed most recent Xeloda  cycle 01/31/2020.  Tentatively due to start new cycle 02/07/2020.\par Now on off week\par \par Most recent CA 27.29 on 01/31/2020 increased to 237.3 was  208.6 12/06/2019 was 256.4 on 11/8/2019 .\par \par Being treated for blepharitis by ophthalmologist  prn.\par \par Today Hgb/Hct 7.7/23.0,  was 8.3/26.8 on 02/02/2020  \par \par  PET/CT 1/16/2020 at [facility] demonstrated new osseous metastases at T11 with destructive changes and C5 with lytic lesion and in right femur.\par \par Patient has ongoing fatigue, although patient states it is slightly better.  No new bone pains.  Has had some intermittent right hip pains.\par \par No other interval event since last seen.

## 2020-03-07 PROBLEM — Z78.9 MEDICAL ORDERS FOR LIFE-SUSTAINING TREATMENT (MOLST) FORM IN CHART: Status: ACTIVE | Noted: 2020-01-01

## 2020-03-07 PROBLEM — Z79.899 HIGH RISK MEDICATION USE: Status: ACTIVE | Noted: 2020-01-01

## 2020-03-07 PROBLEM — M54.9 BACK PAIN, CHRONIC: Status: ACTIVE | Noted: 2018-02-06

## 2020-03-07 PROBLEM — E83.52 HYPERCALCEMIA: Status: ACTIVE | Noted: 2020-01-01

## 2020-03-07 NOTE — ASSESSMENT
[FreeTextEntry1] : We discussed the fact patient has progressive disease and is unlikely to respond to further anti neoplastic therapy 2/6/2020. Patient and daughter given MOLST form to complete. Patient does not want resuscitation (on form she brought). [AdvancecareDate] : 2/6/2020 [FreeTextEntry2] : The patient brought in her living will which will be scanned into her chart. She was given a MOLST form and will fill it out and bring it with her future visits.

## 2020-03-07 NOTE — REVIEW OF SYSTEMS
[Eye Pain] : no eye pain [Dry Eyes] : no dryness of the eyes [Vision Problems] : no vision problems [Mucosal Pain] : no mucosal pain [Shortness Of Breath] : no shortness of breath [Abdominal Pain] : no abdominal pain [Vomiting] : no vomiting [Diarrhea] : no diarrhea [Insomnia] : no insomnia [FreeTextEntry2] : Resting > 50% of the time. goes out to see MD and to have lunch with her friends.,S/P flu vaccination 9/25/2019. [FreeTextEntry3] : No longer symptomatic from blepharitis. [FreeTextEntry4] : Mouth sore resolved with Abreva. [FreeTextEntry8] : Last GYN exam 04/14/2017, no bleeding. [FreeTextEntry9] : See interval history [de-identified] : Unchanged numbness in all fingertips.. Unchanged short term memory deficits. [de-identified] : 'I was born worrying". [de-identified] : See interval history Recieved 2 units of PRBC after last visit.

## 2020-03-07 NOTE — PHYSICAL EXAM
[Normal] : pharynx is unremarkable, moist mucus membrane, no oral lesions [Ulcers] : no ulcers [Mucositis] : no mucositis [Thrush] : no thrush [Vesicles] : no vesicles [de-identified] : Weight stable [de-identified] : Asymmetrical edema of legs L1+, Right trace, no calf tenderness. [de-identified] : Right breast: scars LOQ and Axilla, no breast mass. Left breast: no mass [de-identified] : Positive straight leg raising at 30 degrees left leg

## 2020-03-07 NOTE — HISTORY OF PRESENT ILLNESS
[de-identified] : The patient presented in 2002, at age 74, with an abnormal screening mammogram of the right breast.\par \par \par \par Ultrasound-guided core biopsy on July 18, 2002 demonstrated infiltrating lobular carcinoma which was ER positive (90%), [place] positive (60%) and HER-2/yony positive (3+ by IHC).\par \par \par \par Dr. Mai Brown performed lumpectomy and sentinel lymph node biopsy on August 12, 2002. Pathology was positive for a 1.5 cm infiltrating lobular carcinoma with negative margins. The tumor was also ER positive (90%), [place] positive (70%) and HER-2/yony 3+. 1/2 sentinel lymph nodes was positive for metastatic disease. The patient underwent completion axillary lymph node dissection in September 9, 2002 which demonstrated 26 negative nodes.\par \par \par \par The patient received 5 cycles of adjuvant AC chemotherapy extending from November 4, 2002 through January 27, 2002. She received a cumulative dose of 490 mg (300 mg/meter squared) of Adriamycin.\par \par \par \par The patient received radiation therapy [facility] [place] in [place] under the supervision of Dr. Francia Mckeon.\par \par \par \par The patient subsequently took anastrozole from May 2003 through June 2008.\par \par \par \par The patient was found to have bone metastases on CT scan of her spine in January 2016. She initiated treatment with anastrozole and Xgeva in February 2016. Anastrozole was discontinued on 5/17/2017 because of progression in bone and fulvestrant was initiated on that date. [de-identified] : Infiltrating lobular carcinoma ER positive CT positive HER-2/yony positive [FreeTextEntry1] : CAF x 5 11/4/2002 - 1/27/2003\par Anastrozole 5/2003 - 6/2008, Resumed anastrozole 2/22/2016 - 5/17/2017\par Xgeva start 2/26/2016 - 2019, fractured tooth possibly needing extraction\par Fulvestrant start 5/17/2017 - 4/4/2019\par Ibrance start 4/19/2018 - 5/202019\par Xeloda start 5/9/2019. [de-identified] : The patient is 17 years 8  months post initial diagnosis of breast cancer  and  3 years 11 months  since diagnosis of bone metastases.\par \par The patient has been on Xeloda 500 mg BID for first cycle, 1000 mg BID for cycle 2, 1 week on 1 week off. Resumed Xeloda after 2 month hiatus on 10/19/2019.  The patient completed most recent Xeloda  cycle 01/31/2020.  At time of last visit Xeloda was DC'd because of progression of disease. hospice was discussed at that time.\par \par Most recent CA 27.29 on 02/27/2020 increased to 291.5 was 237.3 on 01/31/2020 was  208.6 12/06/2019 was 256.4 on 11/8/2019 .\par \par Having increased pain radiating down left leg. is in process of scheduling an epidural. Was seen by orthopedist Dr Francisco Urrutia on 1/28/2020.\par \par Being treated for blepharitis by ophthalmologist  prn.\par \par Patient has unchanged fatigue.\par \par On 02/27/2020  Serum calcium = 11.1mg/dl . We discussed the increased dental risks of giving Xgeva but with hypercalcemia the patient and her daughter agree to treatment with Xgeva today..\par \par No other interval event since last seen.

## 2020-04-22 NOTE — HISTORY OF PRESENT ILLNESS
[Other Location: e.g. School (Enter Location, City,State)___] : at [unfilled], at the time of the visit. [Medical Office: ([facility])___] : at the medical office located in  [Other:____] : [unfilled] [Disease: _____________________] : Disease: [unfilled] [T: ___] : T[unfilled] [N: ___] : N[unfilled] [M: ___] : M[unfilled] [AJCC Stage: ____] : AJCC Stage: [unfilled] [Treatment Protocol] : Treatment Protocol [Therapy: ___] : Therapy: [unfilled] [Patient] : the patient [Self] : self [FreeTextEntry2] : Mercy Lassiter [FreeTextEntry4] : daughter Donya Joshi [de-identified] : The patient presented in 2002, at age 74, with an abnormal screening mammogram of the right breast.\par \par \par \par Ultrasound-guided core biopsy on July 18, 2002 demonstrated infiltrating lobular carcinoma which was ER positive (90%), [place] positive (60%) and HER-2/yony positive (3+ by IHC).\par \par \par \par Dr. Mai Brown performed lumpectomy and sentinel lymph node biopsy on August 12, 2002. Pathology was positive for a 1.5 cm infiltrating lobular carcinoma with negative margins. The tumor was also ER positive (90%), [place] positive (70%) and HER-2/yony 3+. 1/2 sentinel lymph nodes was positive for metastatic disease. The patient underwent completion axillary lymph node dissection in September 9, 2002 which demonstrated 26 negative nodes.\par \par \par \par The patient received 5 cycles of adjuvant AC chemotherapy extending from November 4, 2002 through January 27, 2002. She received a cumulative dose of 490 mg (300 mg/meter squared) of Adriamycin.\par \par \par \par The patient received radiation therapy [facility] in [place] under the supervision of Dr. Francia Mckeon.\par \par \par \par The patient subsequently took anastrozole from May 2003 through June 2008.\par \par \par \par The patient was found to have bone metastases on CT scan of her spine in January 2016. She initiated treatment with anastrozole and Xgeva in February 2016. Anastrozole was discontinued on 5/17/2017 because of progression in bone and fulvestrant was initiated on that date. [de-identified] : Infiltrating lobular carcinoma ER positive ID positive HER-2/yony positive [de-identified] : Because of Covid 19 pandemic and the patient's risk of unfavorable outcome if she develops an infection we agreed to doing a virtual visit  today.\par \par Verbal consent given on 4/20/2020 9:17 AM by Mercy Lassiter, patient.. Her daughter Donya Joshi was a witness to the consent.(if needed relationship to patient.\par \par The patient is 17 years 9  months post initial diagnosis of breast cancer  and  4 years 4 months since diagnosis of bone metastases.\par \par On 2/6/2020 Xeloda was discontinued because of progressive disease in bone.  After a detailed discussion the patient and her daughter Donya Joshi opted for palliative care. \par \par At the time of the patient's last visit here, 3/5/2020, we reviewed the fact that further chemotherapy would be unlikely to benefit her and would further worsen her quality of life. Home hospice was discussed but declined by the patient because she could no longer receive blood transfusions. The patient filled out a MOLST form during that visit.\par \par The patient is currently living with her daughter and son-in-law in [place], [place] since 3/22/2020 because of Covid 19 pandemic.\par \par The patient had a telephonic visit with Dr Hinojosa on 3/27/2020. He re-ordered hydrocodone -APAP and advised her to take up to 400 mg gabapentin daily.\par \par Her daughter reports disorientation, anxiety and confusion. she also have nightmares related to Covid19 pandemic,\par \par The patient is up most of the day, walks with walker, able to go up flight of 17 steps\par \par Has pain left hip, does not need hydrocodone every day.\par \par Patient and daughter still considering home hospice. [FreeTextEntry1] : CAF x 5 11/4/2002 - 1/27/2003\par Anastrozole 5/2003 - 6/2008, Resumed anastrozole 2/22/2016 - 5/17/2017\par Xgeva start 2/26/2016 - 2019, fractured tooth possibly needing extraction\par Fulvestrant start 5/17/2017 - 4/4/2019\par Ibrance start 4/19/2018 - 5/202019\par Xeloda start 5/9/2019 - 1/31/2020 (progression)\par Xeloda 5

## 2020-04-22 NOTE — REVIEW OF SYSTEMS
[Red Eyes] : red eyes [SOB on Exertion] : shortness of breath during exertion [Anxiety] : anxiety [Depression] : depression [Negative] : Allergic/Immunologic [Eye Pain] : no eye pain [Dry Eyes] : no dryness of the eyes [Mucosal Pain] : no mucosal pain [Vision Problems] : no vision problems [Vomiting] : no vomiting [Shortness Of Breath] : no shortness of breath [Abdominal Pain] : no abdominal pain [Diarrhea] : no diarrhea [Insomnia] : no insomnia [FreeTextEntry5] : Palpitations with walking. Gets sharp right sided chest pain with anxiety. [FreeTextEntry6] : Mild THOMAS , not short of breath at rest [FreeTextEntry2] : Resting < 50% of the time. feels better off Xeloda. S/P flu vaccination 9/25/2019. [FreeTextEntry8] : Last GYN exam 04/14/2017, no bleeding. [FreeTextEntry7] : Gets indigestion treated with Pepcid AC prn or Pepto Bismol. [FreeTextEntry9] : See interval history [de-identified] : Anxiety attacks related to Covid 19 pandemic. [de-identified] : See interval history [de-identified] : Most recent CBC 3/21/2020: H/H 10.4/33.6

## 2020-04-22 NOTE — PHYSICAL EXAM
[Ambulatory and capable of all self care but unable to carry out any work activities] : Status 2- Ambulatory and capable of all self care but unable to carry out any work activities. Up and about more than 50% of waking hours [Normal] : well developed, well nourished, in no acute distress [de-identified] : VSS taken by daughter today 138/80 P 66, T 98.6 Weight 104 .4 ( ws 108 in office on 3/5/2020)

## 2020-04-22 NOTE — ASSESSMENT
[Supportive] : Goals of care discussed with patient: Supportive [Palliative Care Plan] : Patient was apprised of incurable nature of disease.  Hospice and Palliative care options discussed. [With Patient/Caregiver] : : With Patient/Caregiver [Designated Health Care Proxy] : Designated Health Care Proxy [Name: ___] : Name: [unfilled] [Relationship: ___] : Relationship: [unfilled] [FreeTextEntry1] : We discussed the fact patient has progressive disease and is unlikely to respond to further anti neoplastic therapy 2/6/2020. Patient and daughter given MOLST form to complete. Patient does not want resuscitation (on form she brought). [AdvancecareDate] : 2/6/2020 [FreeTextEntry2] : The patient brought in her living will which will be scanned into her chart. She was given a MOLST form and will fill it out and bring it with her future visits.

## 2020-05-28 NOTE — HISTORY OF PRESENT ILLNESS
[Disease: _____________________] : Disease: [unfilled] [T: ___] : T[unfilled] [N: ___] : N[unfilled] [M: ___] : M[unfilled] [AJCC Stage: ____] : AJCC Stage: [unfilled] [Treatment Protocol] : Treatment Protocol [Therapy: ___] : Therapy: [unfilled] [Other Location: e.g. School (Enter Location, City,State)___] : at [unfilled], at the time of the visit. [Medical Office: ([facility])___] : at the medical office located in  [Verbal consent obtained from patient] : the patient, [unfilled] [Other:____] : [unfilled] [FreeTextEntry4] : Donya Joshi daughter and Tess Timmons RN, hospice nurse [de-identified] : The patient presented in 2002, at age 74, with an abnormal screening mammogram of the right breast.\par \par \par \par Ultrasound-guided core biopsy on July 18, 2002 demonstrated infiltrating lobular carcinoma which was ER positive (90%), [place] positive (60%) and HER-2/yony positive (3+ by IHC).\par \par \par \par Dr. Mai Brown performed lumpectomy and sentinel lymph node biopsy on August 12, 2002. Pathology was positive for a 1.5 cm infiltrating lobular carcinoma with negative margins. The tumor was also ER positive (90%), [place] positive (70%) and HER-2/yony 3+. 1/2 sentinel lymph nodes was positive for metastatic disease. The patient underwent completion axillary lymph node dissection in September 9, 2002 which demonstrated 26 negative nodes.\par \par \par \par The patient received 5 cycles of adjuvant AC chemotherapy extending from November 4, 2002 through January 27, 2002. She received a cumulative dose of 490 mg (300 mg/meter squared) of Adriamycin.\par \par \par \par The patient received [place] [facility] [place] under the supervision of Dr. Francia Mckeon.\par \par \par \par The patient subsequently took anastrozole from May 2003 through June 2008.\par \par \par \par The patient was found to have bone metastases on CT scan of her spine in January 2016. She initiated treatment with anastrozole and Xgeva in February 2016. Anastrozole was discontinued on 5/17/2017 because of progression in bone and fulvestrant was initiated on that date. [de-identified] : Infiltrating lobular carcinoma ER positive [place] positive HER-2/yony positive [FreeTextEntry1] : CAF x 5 11/4/2002 - 1/27/2003\par Anastrozole 5/2003 - 6/2008, Resumed anastrozole 2/22/2016 - 5/17/2017\par Xgeva start 2/26/2016 - 2019, fractured tooth possibly needing extraction\par Fulvestrant start 5/17/2017 - 4/4/2019\par Ibrance start 4/19/2018 - 5/202019\par Xeloda start 5/9/2019 - 1/31/2020 (progression)\par Xeloda 5 [de-identified] : Because of Covid 19 pandemic and the patient's risk of unfavorable outcome if she develops an infection we agreed to doing a virtual visit  today.\par \par Verbal consent given on 5/28/2020 at 1:04 PM by Mercy Lassiter, patient.. Her daughter Donya Joshi was a witness to the consent. Hospice nurse Tess Timmons RN also witnessed the consent and participated in the visit from Donya Joshi's home.\par \par The patient is 17 years 10  months post initial diagnosis of breast cancer  and  4 years 5 months since diagnosis of bone metastases.\par \par On 2/6/2020 Xeloda was discontinued because of progressive disease in bone.  After a detailed discussion the patient and her daughter Donya Joshi opted for palliative care. \par \par The patient has been on hospice since 4/27/2020.\par \par Using O2 during the day almost continuously.\par \par The patient had a telephonic visit with Dr Hinojosa on 3/27/2020. She has another one scheduled with him later today, 5/28/2020.\par \par CBC on 5/11/2020 demonstrated H/H of 10.0/29.8.\par \par Main complaints are shortness of breath and tiredness, "I'm always tired".\par \par No further disorientation, anxiety or nightmares.\par \par The patient is up most of the day, walks with walker, has not attempted to go up stairs for a few weeks because of shortness of breath. Her daughter states she is eating very well.\par \par Back pain markedly improved on dexamethasone 2mg.\par \par

## 2020-05-28 NOTE — PHYSICAL EXAM
[Normal] : affect appropriate [Ambulatory and capable of all self care but unable to carry out any work activities] : Status 2- Ambulatory and capable of all self care but unable to carry out any work activities. Up and about more than 50% of waking hours [Thin] : thin [de-identified] : /52 HR 80, regular Weight 112.6 lbs [de-identified] : Chest CTA as per RN [de-identified] : No edema as per RN

## 2020-05-28 NOTE — REVIEW OF SYSTEMS
[Red Eyes] : red eyes [SOB on Exertion] : shortness of breath during exertion [Negative] : Allergic/Immunologic [Eye Pain] : no eye pain [Dry Eyes] : no dryness of the eyes [Vision Problems] : no vision problems [Mucosal Pain] : no mucosal pain [Shortness Of Breath] : no shortness of breath [Abdominal Pain] : no abdominal pain [Vomiting] : no vomiting [Diarrhea] : no diarrhea [Insomnia] : no insomnia [Anxiety] : no anxiety [Depression] : no depression [FreeTextEntry2] : Resting < 50% of the time. . S/P flu vaccination 9/25/2019. [FreeTextEntry5] : Palpitations with walking. Gets sharp right sided chest pain with anxiety. [FreeTextEntry6] : See interval history [FreeTextEntry7] : Indigestion improved on omeprazole. Eating a lot [FreeTextEntry8] : Last GYN exam 04/14/2017, no bleeding. [FreeTextEntry9] : Decreased back apin on dexamethasone, rarely needs hydrocodone. [de-identified] : Denies anxiety. [de-identified] : Most recent CBC 3/21/2020: H/H 10.4/33.6

## 2020-05-31 PROBLEM — Z51.5 PALLIATIVE CARE BY SPECIALIST: Status: ACTIVE | Noted: 2017-10-01

## 2020-05-31 PROBLEM — M54.10 RADICULAR PAIN: Status: ACTIVE | Noted: 2019-05-02

## 2020-06-01 NOTE — ASSESSMENT
[Palliative] : Goals of care discussed with patient: Palliative [Palliative Care Plan] : Patient was apprised of incurable nature of disease.  Hospice and Palliative care options discussed. 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

## 2020-06-01 NOTE — HISTORY OF PRESENT ILLNESS
[Disease: _____________________] : Disease: [unfilled] [T: ___] : T[unfilled] [M: ___] : M[unfilled] [N: ___] : N[unfilled] [AJCC Stage: ____] : AJCC Stage: [unfilled] [de-identified] : The patient presented in 2002, at age 74, with an abnormal screening mammogram of the right breast. Ultrasound-guided core biopsy on July 18, 2002 demonstrated infiltrating lobular carcinoma which was ER positive (90%), NE positive (60%) and HER-2/yony positive (3+ by IHC)\par \par  Dr. Mai Brown performed lumpectomy and sentinel lymph node biopsy on August 12, 2002. Pathology was positive for a 1.5 cm infiltrating lobular carcinoma with negative margins. The tumor was also ER positive (90%), NE positive (70%) and HER-2/yony 3+. 1/2 sentinel lymph nodes was positive for metastatic disease. The patient underwent completion axillary lymph node dissection in September 9, 2002 which demonstrated 26 negative nodes. The patient received 5 cycles of adjuvant AC chemotherapy extending from November 4, 2002 through January 27, 2002. She received a cumulative dose of 490 mg (300 mg/meter squared) of Adriamycin.\par  \par \par The patient received radiation therapy [facility] [place] [place] in [place] under the supervision of Dr. Francia Mckeon. She subsequently took anastrozole from May 2003 through June 2008.\par 1/12/2016 Admitted to [facility] on 01/21/2016 for elevated BP, dizziness and discharged home 01/25/2016. Brain MRI 01/23/2016 during this hospitalization demonstrated abnormal enhancement of the cervical spine, new since 11/2014.  CT cervical spine 01/24/2016 demonstrated increased density in the left side of the T2 vertebral body of an indeterminate etiology. The patient"s PCP Dr David Booker, sent the patient for a PET / CT 02/04/2016 which demonstrated diffuse hypermetabolic sclerotic bone lesions felt to represent metastatic disease.  CT guided biopsy of T11 on 2/16/2016 was positive for metastatic carcinoma consistent with breast primary, ER positive > 90% and NE positive > 90 %, HER2 Negative.\par MRI C spine was obtained as she began to complain of cervical pain. The MRI was reviewed with neuroradiology here and it was felt that her are metastatic lesions were more likely causing her progressive pain than her degenerative changes. She began to have neck pain around February which was initially controlled with Tylenol, then codeine 15 mg 1-2 tabs. prn. She presented to [facility] room on 4/20 with uncontrolled neck pain and was given Percocet which caused nausea and vomiting.  She tolerated  required up to 45 mg of codeine with transient relief, the addition of Dexamethasone provided better pain control. \par \par 4/25/17 PET CT Diffuse mixed lytic and sclerotic lesions throughout osseous structures, most are not avid and represent treated foci. New hypermetabolism involving the inferior endplate of T11, additional hypermetabolism right iliac bone without definite corresponding CT abnormality. \par \par \par 5/5/16 to 5/11/16 C- spine XRT DR Marshall 5/5 fractions Medical marijuana given for pain and appetite\par radiation induced  mucositis with significant dysphagia, poor po intake  controlled with codeine, Ultram, gabapentin 100 mg, fentanyl patch 12 mcg to CW every 3 days for approximately 1 week with discontinuation as pain improved. \par 5/11/17 MRI Pelvis extensive thrombus sclerotic metastatic lesions diffusely throughout the visualized pelvis, lower vertebrae and proximal femur which correspond to extensive mixed sclerotic and lytic lesions on PET CT. Along the right iliac bone adjacent to the right SI joint there is a region of less sclerotic marrow replacement along the right sacral ala and left iliac bone. No cortical destruction and no pathologic fracture. MIld bilateral SI joint arthrosis. Moderate degenerative changes pubic symphysis. mild bilateral femoral acetabular arthrosis.  Limited evaluation of lumbar spine demonstrates lumbar spine degenerative disc disease and facet arthrosis. \par \par 5/17/17 Anastrazole was discontinued and Fulvestrant was started due to progression of disease in the bone. She initially had worsening right hip pain which improved after initiation of anastrazole. She has had depressed affect and disinterest in food\par 9/13/17 Seen for concern over mood and worry over lack of weight gain.\par 11/14/17 Left hip pain since 10/31. Xray with PMD requested to R/O acute change (ie fracture) Xray + for osseous mets, no fracture. 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   Poor tolerance to opioids (tramadol discontinued when patient noted elevation in BP to 140 systolic).  She has tolerated codeine. Given liquid codeine/tylenol and gabapentin due to dysphagia) .  Mobic discontinued. Did not fill hydromorphone. \par \par  [de-identified] : Infiltrating lobular carcinoma ER positive [place] positive HER-2/yony positive [de-identified] : With daughter present as well.Re=assessment after an initial hiatus in this 89 y/o w/f w MBC who has been declining due to POD and is now on hospice since the end of April.She is comfortable and hospice has been responsive to their needs.No longer using opioids.

## 2020-06-01 NOTE — PHYSICAL EXAM
[Ambulatory and capable of all self care but unable to carry out any work activities] : Status 2- Ambulatory and capable of all self care but unable to carry out any work activities. Up and about more than 50% of waking hours [Cachectic] : cachectic [Normal] : normal appearance, no rash, nodules, vesicles, ulcers, erythema [de-identified] : ROM left hip improved  [de-identified] : grossly on focal

## 2020-06-01 NOTE — REASON FOR VISIT
[Medical Office: ([facility])___] : at the medical office located in  [Home] : at home, [unfilled] , at the time of the visit. [Follow-Up Visit] : a follow-up [Family Member] : family member [Verbal consent obtained from patient] : the patient, [unfilled] [FreeTextEntry2] : pain

## 2020-06-01 NOTE — REVIEW OF SYSTEMS
[Fatigue] : fatigue [Recent Change In Weight] : ~T recent weight change [Dysphagia] : dysphagia [Hoarseness] : hoarseness [SOB on Exertion] : shortness of breath during exertion [Odynophagia] : odynophagia [Constipation] : constipation [Vomiting] : vomiting [Joint Pain] : joint pain [Joint Stiffness] : joint stiffness [Negative] : Allergic/Immunologic [Insomnia] : no insomnia [FreeTextEntry4] : hoarseness  and dysphagia is improved [FreeTextEntry7] : BM every 3 days; hx GERD, Barretts esophagus long standing tx over this week; colonoscopy 2013,  told she does not need to do it again by gastroenterologist, . [FreeTextEntry8] : Last GYN exam 10/2015, exam benign [FreeTextEntry9] : left hip; back [de-identified] : trigger finger digit 3 right hand since resuming anastrozole, hard to use right middle finger

## 2020-06-25 PROBLEM — Z91.81 AT RISK FOR FALLING: Status: ACTIVE | Noted: 2020-01-01

## 2020-06-25 NOTE — REVIEW OF SYSTEMS
[Red Eyes] : red eyes [SOB on Exertion] : shortness of breath during exertion [Negative] : Allergic/Immunologic [Eye Pain] : no eye pain [Dry Eyes] : no dryness of the eyes [Vision Problems] : no vision problems [Shortness Of Breath] : no shortness of breath [Mucosal Pain] : no mucosal pain [Abdominal Pain] : no abdominal pain [Vomiting] : no vomiting [Diarrhea] : no diarrhea [Insomnia] : no insomnia [Anxiety] : no anxiety [FreeTextEntry2] : Resting < 50% of the time.  S/P flu vaccination 9/25/2019. [Depression] : no depression [FreeTextEntry5] : Palpitations with walking. [FreeTextEntry6] : See interval history [FreeTextEntry7] : Indigestion improved on omeprazole. See interval history [FreeTextEntry9] : See interval history [FreeTextEntry8] : Last GYN exam 04/14/2017, no bleeding. [de-identified] : Denies anxiety or depression [de-identified] : Most recent CBC 3/21/2020: H/H 10.4/33.6

## 2020-06-25 NOTE — HISTORY OF PRESENT ILLNESS
[Disease: _____________________] : Disease: [unfilled] [T: ___] : T[unfilled] [N: ___] : N[unfilled] [M: ___] : M[unfilled] [AJCC Stage: ____] : AJCC Stage: [unfilled] [Treatment Protocol] : Treatment Protocol [Therapy: ___] : Therapy: [unfilled] [Other Location: e.g. School (Enter Location, City,State)___] : at [unfilled], at the time of the visit. [FreeTextEntry3] : Donya Joshi, daughter [FreeTextEntry4] : Daryl Joshi [de-identified] : Infiltrating lobular carcinoma ER positive [place] positive HER-2/yony positive [de-identified] : The patient presented in 2002, at age 74, with an abnormal screening mammogram of the right breast.\par \par \par \par Ultrasound-guided core biopsy on July 18, 2002 demonstrated infiltrating lobular carcinoma which was ER positive (90%), SD positive (60%) and HER-2/yony positive (3+ by IHC).\par \par \par \par Dr. Mai Brown performed lumpectomy and sentinel lymph node biopsy on August 12, 2002. Pathology was positive for a 1.5 cm infiltrating lobular carcinoma with negative margins. The tumor was also ER positive (90%), SD positive (70%) and HER-2/yony 3+. 1/2 sentinel lymph nodes was positive for metastatic disease. The patient underwent completion axillary lymph node dissection in September 9, 2002 which demonstrated 26 negative nodes.\par \par \par \par The patient received 5 cycles of adjuvant AC chemotherapy extending from November 4, 2002 through January 27, 2002. She received a cumulative dose of 490 mg (300 mg/meter squared) of Adriamycin.\par \par \par \par The patient received radiation therapy [facility] [place] in [place] under the supervision of Dr. Francia Mckeon.\par \par \par \par The patient subsequently took anastrozole from May 2003 through June 2008.\par \par \par \par The patient was found to have bone metastases on CT scan of her spine in January 2016. She initiated treatment with anastrozole and Xgeva in February 2016. Anastrozole was discontinued on 5/17/2017 because of progression in bone and fulvestrant was initiated on that date. [FreeTextEntry1] : CAF x 5 11/4/2002 - 1/27/2003\par Anastrozole 5/2003 - 6/2008, Resumed anastrozole 2/22/2016 - 5/17/2017\par Xgeva start 2/26/2016 - 2019, fractured tooth possibly needing extraction\par Fulvestrant start 5/17/2017 - 4/4/2019\par Ibrance start 4/19/2018 - 5/202019\par Xeloda start 5/9/2019 - 1/31/2020 (progression)\par Xeloda 5 [de-identified] : Because of Covid 19 pandemic and the patient's risk of unfavorable outcome if she develops an infection we agreed to doing a virtual visit  today.\par \par Verbal consent given on 6/25/2020 at 1:05 PM by Donya Joshi, patient's daughter.. Her son-in law Daryl Joshi witnessed the consent. \par \par The patient is 17 years 11  months post initial diagnosis of breast cancer  and  4 years 6 months since diagnosis of bone metastases.\par \par On 2/6/2020 Xeloda was discontinued because of progressive disease in bone.  After a detailed discussion the patient and her daughter Donya Joshi opted for palliative care. \par \par The patient has been on hospice since 4/27/2020.\par \par The patient had a telephonic visit with Dr Hinojosa on  5/28/2020.\par \par Feels tired.\par \par The patient is up most of the day, walks short distances with walker. Balance is worse. occasional shortness of breath. Uses O2 2-3 liters per NC during the day intermittently, after meals.\par \par Dexamethasone was decreased to 1 mg daily because of voracious appetite. At the time the patient had no pain. Pain in left hip and thigh "is not an issue". Experiencing sharp "jabbing' occipital pains lasting 1- 5 minutes, not daily, relieved by heat.\par \par Patient had dental exam and cleaning and was referred to oral surgeon for extractions.  I spoke with Dr Erick Aponte on 6/18/2020 re risk of ONJ and patient now scheduled for 2 root canals on 6/29/2020.\par \par Patient was started on desmopressin by hospice doctor to decrease urinary frequency, sodium level was rechecked and patient received verbal report it was 135.\par \par The patient's daughter states she is content in her surroundings. The patient will be celebrating her 89 birthday on 6/30/2020 with her family including her great-granddaughter.

## 2020-06-25 NOTE — ASSESSMENT
[Palliative Care Plan] : Patient was apprised of incurable nature of disease.  Hospice and Palliative care options discussed. [Supportive] : Goals of care discussed with patient: Supportive [With Patient/Caregiver] : : With Patient/Caregiver [Name: ___] : Name: [unfilled] [Designated Health Care Proxy] : Designated Health Care Proxy [Relationship: ___] : Relationship: [unfilled] [FreeTextEntry1] : We discussed the fact patient has progressive disease and is unlikely to respond to further anti neoplastic therapy 2/6/2020. Patient and daughter given MOLST form to complete. Patient does not want resuscitation (on form she brought). [FreeTextEntry2] : The patient brought in her living will which will be scanned into her chart. She was given a MOLST form and will fill it out and bring it with her future visits. [AdvancecareDate] : 2/6/2020

## 2020-06-25 NOTE — PHYSICAL EXAM
[Capable of only limited self care, confined to bed or chair more than 50% of waking hours] : Status 3- Capable of only limited self care, confined to bed or chair more than 50% of waking hours [de-identified] : Weight 118.4 lbs [de-identified] : Lung fields clear yesterday as per hospice nurse.

## 2020-07-25 PROBLEM — S02.5XXA BROKEN TOOTH: Status: ACTIVE | Noted: 2019-04-04

## 2020-07-25 NOTE — PHYSICAL EXAM
[Capable of only limited self care, confined to bed or chair more than 50% of waking hours] : Status 3- Capable of only limited self care, confined to bed or chair more than 50% of waking hours [Normal] : affect appropriate [de-identified] : Senile purpura [de-identified] : Lungs field clear by hospice nurse 7/21/2020 [de-identified] : /60,  HR 62 T 97.4 F, on 7/21/2020 Weight 125.6 lb.

## 2020-07-25 NOTE — ASSESSMENT
[Supportive] : Goals of care discussed with patient: Supportive [Palliative Care Plan] : Patient was apprised of incurable nature of disease.  Hospice and Palliative care options discussed. [With Patient/Caregiver] : : With Patient/Caregiver [Relationship: ___] : Relationship: [unfilled] [Designated Health Care Proxy] : Designated Health Care Proxy [Name: ___] : Name: [unfilled] [FreeTextEntry1] : We discussed the fact patient has progressive disease and is unlikely to respond to further anti neoplastic therapy 2/6/2020. Patient and daughter given MOLST form to complete. Patient does not want resuscitation (on form she brought). [AdvancecareDate] : 2/6/2020 [FreeTextEntry2] : The patient brought in her living will which will be scanned into her chart. She was given a MOLST form and will fill it out and bring it with her future visits.

## 2020-07-25 NOTE — HISTORY OF PRESENT ILLNESS
[Medical Office: ([facility])___] : at the medical office located in  [Disease: _____________________] : Disease: [unfilled] [M: ___] : M[unfilled] [N: ___] : N[unfilled] [T: ___] : T[unfilled] [AJCC Stage: ____] : AJCC Stage: [unfilled] [Therapy: ___] : Therapy: [unfilled] [Treatment Protocol] : Treatment Protocol [Other Location: e.g. School (Enter Location, City,State)___] : at [unfilled], at the time of the visit. [Verbal consent obtained from patient] : the patient, [unfilled] [de-identified] : The patient presented in 2002, at age 74, with an abnormal screening mammogram of the right breast.\par \par \par \par Ultrasound-guided core biopsy on July 18, 2002 demonstrated infiltrating lobular carcinoma which was ER positive (90%), [place] positive (60%) and HER-2/yony positive (3+ by IHC).\par \par \par \par Dr. Mai Brown performed lumpectomy and sentinel lymph node biopsy on August 12, 2002. Pathology was positive for a 1.5 cm infiltrating lobular carcinoma with negative margins. The tumor was also ER positive (90%), HI positive (70%) and HER-2/yony 3+. 1/2 sentinel lymph nodes was positive for metastatic disease. The patient underwent completion axillary lymph node dissection in September 9, 2002 which demonstrated 26 negative nodes.\par \par \par \par The patient received 5 cycles of adjuvant AC chemotherapy extending from November 4, 2002 through January 27, 2002. She received a cumulative dose of 490 mg (300 mg/meter squared) of Adriamycin.\par \par \par \par The patient received radiation therapy [facility] in [place] under the supervision of Dr. Francia Mckeon.\par \par \par \par The patient subsequently took anastrozole from May 2003 through June 2008.\par \par \par \par The patient was found to have bone metastases on CT scan of her spine in January 2016. She initiated treatment with anastrozole and Xgeva in February 2016. Anastrozole was discontinued on 5/17/2017 because of progression in bone and fulvestrant was initiated on that date. [de-identified] : Infiltrating lobular carcinoma ER positive [place] positive HER-2/yony positive [FreeTextEntry1] : CAF x 5 11/4/2002 - 1/27/2003\par Anastrozole 5/2003 - 6/2008, Resumed anastrozole 2/22/2016 - 5/17/2017\par Xgeva start 2/26/2016 - 2019, fractured tooth possibly needing extraction\par Fulvestrant start 5/17/2017 - 4/4/2019\par Ibrance start 4/19/2018 - 5/202019\par Xeloda start 5/9/2019 - 1/31/2020 (progression)\par Xeloda 5 [de-identified] : Because of Covid 19 pandemic and the fact she is on hospice we agreed to doing a virtual visit  today. The visit was conducted via the [employer] platform at the patient's request.\par \par Verbal consent given on 7/23/2020 at 12:01 PM Mercy Lassiter, patient. Witnessed by Donya Joshi, patient's daughter. \par \par The patient is 18 years post initial diagnosis of breast cancer  and  4 years 7 months since diagnosis of bone metastases.\par \par The patient has been off treatment and receiving palliative care since 2/6/2020 . \par \par The patient has been on hospice since 4/27/2020.\par \par The patient last had a telephonic visit with Dr Hinojosa on  5/28/2020.\par \par Has increased swelling of feet and ankles.\par \par Short term memory is "slipping". Has pain in back and left hip yesterday, given hydrocodone with relief.  Prior to yesterday pain was well controlled with dexamethasone and Tylenol.\par \par \par The patient is awake 40% of the time. Nods off in her chair. Balance is "very poor". Uses oxygen intermittently 3-4 times per week.\par \par Dexamethasone was decreased to 1 mg daily because of voracious appetite. At the time the patient had no pain. Pain in left hip and thigh "is not an issue". 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

## 2020-09-17 ENCOUNTER — APPOINTMENT (OUTPATIENT)
Dept: HEMATOLOGY ONCOLOGY | Facility: CLINIC | Age: 85
End: 2020-09-17

## 2020-10-13 ENCOUNTER — APPOINTMENT (OUTPATIENT)
Dept: HEMATOLOGY ONCOLOGY | Facility: CLINIC | Age: 85
End: 2020-10-13

## 2020-11-17 ENCOUNTER — APPOINTMENT (OUTPATIENT)
Dept: HEMATOLOGY ONCOLOGY | Facility: CLINIC | Age: 85
End: 2020-11-17

## 2020-12-21 PROBLEM — Z87.09 HISTORY OF UPPER RESPIRATORY INFECTION: Status: RESOLVED | Noted: 2019-04-04 | Resolved: 2020-12-21

## 2020-12-22 ENCOUNTER — APPOINTMENT (OUTPATIENT)
Dept: HEMATOLOGY ONCOLOGY | Facility: CLINIC | Age: 85
End: 2020-12-22

## 2021-01-26 ENCOUNTER — APPOINTMENT (OUTPATIENT)
Dept: HEMATOLOGY ONCOLOGY | Facility: CLINIC | Age: 86
End: 2021-01-26